# Patient Record
Sex: FEMALE | Race: WHITE | Employment: FULL TIME | URBAN - METROPOLITAN AREA
[De-identification: names, ages, dates, MRNs, and addresses within clinical notes are randomized per-mention and may not be internally consistent; named-entity substitution may affect disease eponyms.]

---

## 2017-01-29 RX ORDER — VALSARTAN 160 MG/1
TABLET ORAL
Qty: 90 TAB | Refills: 3 | Status: SHIPPED | OUTPATIENT
Start: 2017-01-29 | End: 2017-04-06 | Stop reason: SDUPTHER

## 2017-04-06 ENCOUNTER — OFFICE VISIT (OUTPATIENT)
Dept: INTERNAL MEDICINE CLINIC | Age: 62
End: 2017-04-06

## 2017-04-06 VITALS
SYSTOLIC BLOOD PRESSURE: 150 MMHG | HEIGHT: 65 IN | RESPIRATION RATE: 16 BRPM | TEMPERATURE: 96.7 F | BODY MASS INDEX: 26.39 KG/M2 | HEART RATE: 91 BPM | WEIGHT: 158.4 LBS | DIASTOLIC BLOOD PRESSURE: 70 MMHG | OXYGEN SATURATION: 97 %

## 2017-04-06 DIAGNOSIS — J30.89 NON-SEASONAL ALLERGIC RHINITIS, UNSPECIFIED ALLERGIC RHINITIS TRIGGER: ICD-10-CM

## 2017-04-06 DIAGNOSIS — I10 ESSENTIAL HYPERTENSION: Primary | ICD-10-CM

## 2017-04-06 DIAGNOSIS — R05.3 CHRONIC COUGH: ICD-10-CM

## 2017-04-06 RX ORDER — VALSARTAN 320 MG/1
320 TABLET ORAL DAILY
Qty: 90 TAB | Refills: 1 | Status: SHIPPED | OUTPATIENT
Start: 2017-04-06 | End: 2017-09-28 | Stop reason: SDUPTHER

## 2017-04-06 NOTE — MR AVS SNAPSHOT
Visit Information Date & Time Provider Department Dept. Phone Encounter #  
 4/6/2017  8:20 AM Angie Cerrato MD [place] [id number] [facility] [phone] [id number] Follow-up Instructions Return in about 3 weeks (around 4/27/2017) for 3- 4 week establish care / hypertension check . Upcoming Health Maintenance Date Due COLONOSCOPY 6/12/1973 BREAST CANCER SCRN MAMMOGRAM 6/12/2005 ZOSTER VACCINE AGE 60> 6/12/2015 PAP AKA CERVICAL CYTOLOGY 12/15/2019 DTaP/Tdap/Td series (3 - Td) 10/6/2025 Allergies as of 4/6/2017  Review Complete On: 4/6/2017 By: Angie Cerrato MD  
 No Known Allergies Current Immunizations  Reviewed on 10/15/2015 Name Date Influenza Vaccine 10/27/2016, 10/6/2015, 10/31/2014, 11/5/2013  8:45 AM  
 Pneumococcal Polysaccharide (PPSV-23) 11/30/2016 TDAP Vaccine 3/28/2012 Tdap 10/6/2015 Not reviewed this visit You Were Diagnosed With   
  
 Codes Comments Essential hypertension    -  Primary ICD-10-CM: I10 
ICD-9-CM: 401.9 Cough     ICD-10-CM: R05 ICD-9-CM: 786.2 Non-seasonal allergic rhinitis, unspecified allergic rhinitis trigger     ICD-10-CM: J30.89 ICD-9-CM: 477.8 Vitals BP Pulse Temp Resp Height(growth percentile) Weight(growth percentile) 150/70 (BP 1 Location: Left arm, BP Patient Position: Sitting) 91 96.7 °F (35.9 °C) 16 5' 5\" (1.651 m) 158 lb 6.4 oz (71.8 kg) SpO2 BMI OB Status Smoking Status 97% 26.36 kg/m2 Hysterectomy Former Smoker BMI and BSA Data Body Mass Index Body Surface Area  
 26.36 kg/m 2 1.81 m 2 Preferred Pharmacy Pharmacy Name Phone  [address] [phone] Your Updated Medication List  
  
   
This list is accurate as of: 4/6/17  9:01 AM.  Always use your most recent med list. ALLEGRA ALLERGY PO Take  by mouth. ibuprofen 200 mg tablet Commonly known as:  MOTRIN  
 Take 200 mg by mouth nightly. multivitamin tablet Commonly known as:  ONE A DAY Take 1 Tab by mouth daily. simvastatin 20 mg tablet Commonly known as:  ZOCOR  
TAKE 1 TABLET NIGHTLY  
  
 TAZORAC 0.05 % topical gel Generic drug:  tazorotene Apply  to affected area nightly. use thin film  
  
 triamcinolone 55 mcg nasal inhaler Commonly known as:  NASACORT AQ  
2 Sprays daily. valsartan 320 mg tablet Commonly known as:  DIOVAN Take 1 Tab by mouth daily. Prescriptions Sent to Pharmacy Refills  
 valsartan (DIOVAN) 320 mg tablet 1 Sig: Take 1 Tab by mouth daily. Class: Normal  
 Pharmacy: [facility] [address] Ph #: [phone] Route: Oral  
  
We Performed the Following REFERRAL TO ALLERGY [REF5 Custom] Comments:  
 Please evaluate patient for chronic cough. Follow-up Instructions Return in about 3 weeks (around 4/27/2017) for 3- 4 week establish care / hypertension check . Referral Information Referral ID Referred By Referred To  
  
 [id number] [address] Candi Morris MD   
   [address] [place] [address] Phone: [phone] Fax: [phone] Visits Status Start Date End Date 1 New Request 4/6/17 4/6/18 If your referral has a status of pending review or denied, additional information will be sent to support the outcome of this decision. Introducing \[facility]\"" & HEALTH SERVICES! Elysia Stubbs introduces [employer] patient portal. Now you can access parts of your medical record, email your doctor's office, and request medication refills online. 1. In your internet browser, go to [email or web address] 2. Click on the First Time User? Click Here link in the Sign In box. You will see the New Member Sign Up page. 3. Enter your [employer] Access Code exactly as it appears below.  You will not need to use this code after youve completed the sign-up process. If you do not sign up before the expiration date, you must request a new code. · [employer] Access Code: [email or web address] Expires: 7/5/2017  9:01 AM 
 
4. Enter the last four digits of your Social Security Number (xxxx) and Date of Birth (mm/dd/yyyy) as indicated and click Submit. You will be taken to the next sign-up page. 5. Create a [employer] ID. This will be your [employer] login ID and cannot be changed, so think of one that is secure and easy to remember. 6. Create a [employer] password. You can change your password at any time. 7. Enter your Password Reset Question and Answer. This can be used at a later time if you forget your password. 8. Enter your e-mail address. You will receive e-mail notification when new information is available in [address]. 9. Click Sign Up. You can now view and download portions of your medical record. 10. Click the Download Summary menu link to download a portable copy of your medical information. If you have questions, please visit the Frequently Asked Questions section of the [employer] website. Remember, [employer] is NOT to be used for urgent needs. For medical emergencies, dial 911. Now available from your iPhone and Android! Please provide this summary of care documentation to your next provider. Your primary care clinician is listed as [address]. If you have any questions after today's visit, please call [phone].

## 2017-04-06 NOTE — PROGRESS NOTES
HPI:  Artemio Ramsey is a 64y.o. year old female who returns to clinic today for routine follow up appointment to discuss the issues below:    Here for blood pressure recheck. BP readings at home on home cuff are generally above 140/90. Went [facility] for [facility] symptoms last week - had exposure to those with pneumonia, strep and flu and wanted to make sure she didn't have these illnesses. She has had an ongoing cough productive of clear phlegm over the past year and a half associated with mild nasal congestion. She denies any seasonality to cough. On Nasacort, allegra and recently added Coricidin HBP - this has improved her sx. Remotely saw an allergist .        CXR clear at [facility].    No rhinorrhea, itchy water eyes, wheezing or chest tightness. Reports dyspnea with stair climbing due to \"being out of shape. \"      Prior to Admission medications    Medication Sig Start Date End Date Taking? Authorizing Provider   valsartan (DIOVAN) 160 mg tablet TAKE 1 TABLET DAILY 1/29/17  Yes Humaira Woodruff MD   simvastatin (ZOCOR) 20 mg tablet TAKE 1 TABLET NIGHTLY 12/4/16  Yes Humaira Woodruff MD   triamcinolone (NASACORT AQ) 55 mcg nasal inhaler 2 Sprays daily. Yes Historical Provider   multivitamin (ONE A DAY) tablet Take 1 Tab by mouth daily. Yes Historical Provider   tazorotene (TAZORAC) 0.05 % topical gel Apply  to affected area nightly. use thin film   Yes Historical Provider   FEXOFENADINE HCL (ALLEGRA ALLERGY PO) Take  by mouth. Historical Provider   ibuprofen (MOTRIN) 200 mg tablet Take 200 mg by mouth nightly. Historical Provider          No Known Allergies        Review of Systems   Constitutional: Negative for chills, fever and malaise/fatigue. HENT: Positive for congestion. Respiratory: Positive for cough and shortness of breath (winded when she climbs steps ). Negative for wheezing. Cardiovascular: Negative for chest pain, palpitations and leg swelling.    Gastrointestinal: Negative for abdominal pain, blood in stool and heartburn. Musculoskeletal: Negative for falls, joint pain and myalgias. Neurological: Negative for dizziness and headaches. Physical Exam   Constitutional: She appears well-nourished. HENT:   Head: Head is without raccoon's eyes. Right Ear: Tympanic membrane and ear canal normal.   Left Ear: Tympanic membrane and ear canal normal.   Nose: No mucosal edema, rhinorrhea or nasal deformity. Mouth/Throat: Uvula is midline and oropharynx is clear and moist. No posterior oropharyngeal erythema. Neck: Carotid bruit is not present. Cardiovascular: Normal rate, regular rhythm and normal heart sounds. No murmur heard. Pulses:       Carotid pulses are 2+ on the right side, and 2+ on the left side. Pulmonary/Chest: Effort normal and breath sounds normal.   Abdominal: Soft. Bowel sounds are normal. There is no hepatosplenomegaly. There is no tenderness. Musculoskeletal: She exhibits no edema. Visit Vitals    /70 (BP 1 Location: Left arm, BP Patient Position: Sitting)    Pulse 91    Temp 96.7 °F (35.9 °C)    Resp 16    Ht 5' 5\" (1.651 m)    Wt 158 lb 6.4 oz (71.8 kg)    SpO2 97%    BMI 26.36 kg/m2         Assessment & Plan:  Vj Gómez was seen today for elevated blood pressure and allergies. Diagnoses and all orders for this visit:    Essential hypertension  BP above goal.  Increase Diovan to 320 mg daily  F/u in 3-4 weeks, continue home monitoring.  -     valsartan (DIOVAN) 320 mg tablet; Take 1 Tab by mouth daily. Chronic cough  ddx - allergic cough, cough variant asthma, neurogenic cough. I will have her visit with an allergist and go from there.  -     REFERRAL TO ALLERGY    Non-seasonal allergic rhinitis, unspecified allergic rhinitis trigger  On three drug regimen with improvement with addition of Coricidin.   Advised to continue for now until allergist sherri.    -     REFERRAL TO ALLERGY         Follow-up Disposition:  Return in about 3 weeks (around 4/27/2017) for 3- 4 week establish care / hypertension check . Advised her to call back or return to office if symptoms worsen/change/persist.  Discussed expected course/resolution/complications of diagnosis in detail with patient. Medication risks/benefits/costs/interactions/alternatives discussed with patient. She was given an after visit summary which includes diagnoses, current medications, & vitals. She expressed understanding with the diagnosis and plan.

## 2017-04-06 NOTE — PROGRESS NOTES
Chief Complaint   Patient presents with    Allergies    Elevated Blood Pressure     Reviewed record in preparation for visit and have obtained necessary documentation. Identified pt with two pt identifiers(name and ). Health Maintenance Due   Topic    COLONOSCOPY     BREAST CANCER SCRN MAMMOGRAM     ZOSTER VACCINE AGE 60>          Chief Complaint   Patient presents with    Allergies    Elevated Blood Pressure        Wt Readings from Last 3 Encounters:   17 158 lb 6.4 oz (71.8 kg)   12/15/16 165 lb (74.8 kg)   16 165 lb (74.8 kg)     Temp Readings from Last 3 Encounters:   17 96.7 °F (35.9 °C)   12/15/16 97.5 °F (36.4 °C) (Oral)   16 97.7 °F (36.5 °C) (Oral)     BP Readings from Last 3 Encounters:   17 150/70   12/15/16 148/86   16 158/90     Pulse Readings from Last 3 Encounters:   17 91   12/15/16 77   16 76           Learning Assessment:  :     Learning Assessment 10/1/2015 7/10/2014   PRIMARY LEARNER Patient Patient   HIGHEST LEVEL OF EDUCATION - PRIMARY LEARNER  SOME COLLEGE SOME COLLEGE   BARRIERS PRIMARY LEARNER NONE NONE   CO-LEARNER CAREGIVER No No   PRIMARY LANGUAGE ENGLISH ENGLISH    NEED No No   LEARNER PREFERENCE PRIMARY READING LISTENING   LEARNING SPECIAL TOPICS no no   ANSWERED BY patient patient   RELATIONSHIP SELF SELF       Depression Screening:  :     PHQ 2 / 9, over the last two weeks 2016   Little interest or pleasure in doing things Not at all   Feeling down, depressed or hopeless Not at all   Total Score PHQ 2 0       Fall Risk Assessment:  :     No flowsheet data found. Abuse Screening:  :     Abuse Screening Questionnaire 10/1/2015 7/10/2014   Do you ever feel afraid of your partner? N N   Are you in a relationship with someone who physically or mentally threatens you? N N   Is it safe for you to go home?  Y Y       Coordination of Care Questionnaire:  :     1) Have you been to an emergency room, urgent care clinic since your last visit? no   Hospitalized since your last visit? no             2) Have you seen or consulted any other health care providers outside of [address] [place] [address] since your last visit? no  (Include any pap smears or colon screenings in this section.)    3) Do you have an Advance Directive on file? no    4) Are you interested in receiving information on Advance Directives? NO      Patient is accompanied by self I have received verbal consent from Laura Reid to discuss any/all medical information while they are present in the room. Reviewed record  In preparation for visit and have obtained necessary documentation.

## 2017-04-26 ENCOUNTER — OFFICE VISIT (OUTPATIENT)
Dept: INTERNAL MEDICINE CLINIC | Age: 62
End: 2017-04-26

## 2017-04-26 VITALS
SYSTOLIC BLOOD PRESSURE: 130 MMHG | HEART RATE: 77 BPM | WEIGHT: 155 LBS | BODY MASS INDEX: 25.83 KG/M2 | HEIGHT: 65 IN | RESPIRATION RATE: 18 BRPM | TEMPERATURE: 98.2 F | DIASTOLIC BLOOD PRESSURE: 82 MMHG | OXYGEN SATURATION: 98 %

## 2017-04-26 DIAGNOSIS — J30.81 CAT ALLERGIES: ICD-10-CM

## 2017-04-26 DIAGNOSIS — Z00.00 ROUTINE GENERAL MEDICAL EXAMINATION AT A HEALTH CARE FACILITY: ICD-10-CM

## 2017-04-26 DIAGNOSIS — E55.9 VITAMIN D DEFICIENCY: ICD-10-CM

## 2017-04-26 DIAGNOSIS — E04.1 THYROID NODULE: ICD-10-CM

## 2017-04-26 DIAGNOSIS — R51.9 NONINTRACTABLE HEADACHE, UNSPECIFIED CHRONICITY PATTERN, UNSPECIFIED HEADACHE TYPE: ICD-10-CM

## 2017-04-26 DIAGNOSIS — I10 ESSENTIAL HYPERTENSION: Primary | ICD-10-CM

## 2017-04-26 NOTE — PROGRESS NOTES
Chief Complaint   Patient presents with    Establish Care     switch from [address] [place] [address]     1. Have you been to the ER, urgent care clinic since your last visit? No  Hospitalized since your last visit? No    2. Have you seen or consulted any other health care providers outside of the [address] [place] [address] since your last visit? Yes, Dr Reginaldo Pagan any pap smears or colon screening.  No

## 2017-04-26 NOTE — MR AVS SNAPSHOT
Visit Information Date & Time Provider Department Dept. Phone Encounter #  
 4/26/2017  8:15 AM Brigida Teran MD [place] [address] [facility] [phone] Upcoming Health Maintenance Date Due COLONOSCOPY 6/12/1973 BREAST CANCER SCRN MAMMOGRAM 6/12/2005 ZOSTER VACCINE AGE 60> 6/12/2015 PAP AKA CERVICAL CYTOLOGY 12/15/2019 DTaP/Tdap/Td series (3 - Td) 10/6/2025 Allergies as of 4/26/2017  Review Complete On: 4/26/2017 By: Jyotsna Cottrell No Known Allergies Current Immunizations  Reviewed on 10/15/2015 Name Date Influenza Vaccine 10/27/2016, 10/6/2015, 10/31/2014, 11/5/2013  8:45 AM  
 Pneumococcal Polysaccharide (PPSV-23) 11/30/2016 TDAP Vaccine 3/28/2012 Tdap 10/6/2015 Not reviewed this visit You Were Diagnosed With   
  
 Codes Comments Essential hypertension    -  Primary ICD-10-CM: I10 
ICD-9-CM: 401.9 Vitamin D deficiency     ICD-10-CM: E55.9 ICD-9-CM: 268.9 Cat allergies     ICD-10-CM: J30.81 ICD-9-CM: 477.8 Nonintractable headache, unspecified chronicity pattern, unspecified headache type     ICD-10-CM: R51 ICD-9-CM: 879. 0 Vitals BP Pulse Temp Resp Height(growth percentile) Weight(growth percentile) 130/82 (BP 1 Location: Right arm, BP Patient Position: Sitting) 77 98.2 °F (36.8 °C) (Oral) 18 5' 5\" (1.651 m) 155 lb (70.3 kg) SpO2 BMI OB Status Smoking Status 98% 25.79 kg/m2 Hysterectomy Former Smoker BMI and BSA Data Body Mass Index Body Surface Area 25.79 kg/m 2 1.8 m 2 Preferred Pharmacy Pharmacy Name Phone  [address] [phone] Your Updated Medication List  
  
   
This list is accurate as of: 4/26/17  8:36 AM.  Always use your most recent med list. ALLEGRA ALLERGY PO Take  by mouth. ibuprofen 200 mg tablet Commonly known as:  MOTRIN Take 200 mg by mouth nightly. multivitamin tablet Commonly known as:  ONE A DAY Take 1 Tab by mouth daily. simvastatin 20 mg tablet Commonly known as:  ZOCOR  
TAKE 1 TABLET NIGHTLY  
  
 TAZORAC 0.05 % topical gel Generic drug:  tazorotene Apply  to affected area nightly. use thin film  
  
 triamcinolone 55 mcg nasal inhaler Commonly known as:  NASACORT AQ  
2 Sprays daily. valsartan 320 mg tablet Commonly known as:  DIOVAN Take 1 Tab by mouth daily. To-Do List   
 04/27/2017 Lab:  METABOLIC PANEL, COMPREHENSIVE Patient Instructions Home-made Nasal Saline Rinse Directions for preparing home-made nasal saline 1. Clean a 1-Quart glass jar carefully, then fill it with bottled or boiled water. 2. Add 1 or 2 heaping teaspoons of pickling or brian salt, or Kosher salt. If you use table salt, you may be getting a preservative and/or additive which might irritate your nose. 3. Add 1 rounded teaspoon of baking soda (pure bicarbonate). 4. Store at room temperature and shake or stir before each use. 5. Mix a new batch weekly. To use: 1. Pour some of the mixture into a clean bowl. Warming it to body temperature may help, but make sure it is not hot. 2. Fill a spray bottle or a bulb syringe. 3. Stand over the sink or in the shower and apply gentle pressure on the syringe or bottle so that the the mixture goes into one side of the nose and comes out the other side. Tilt your head so you are inserting the syringe on the top nostril and forward so it doesn't go to the back of throat. Use slight pressure from the syringe and allow the mixture to come out the other nostril on the bottom as your head is tilted. 4. Rinse the nose at daily especially at night before bedtime to wash away all of the allergens you accumulated during the day. You will breathe better and therefore sleep deeper. Increase vit D to 2000 units a day Introducing [facility] & HEALTH SERVICES! Dear Festus Jonas: Thank you for requesting a [employer] account. Our records indicate that you already have an active [employer] account. You can access your account anytime at [email or web address] Did you know that you can access your hospital and ER discharge instructions at any time in Hypercontext? You can also review all of your test results from your hospital stay or ER visit. Additional Information If you have questions, please visit the Frequently Asked Questions section of the [employer] website at [email or web address] Remember, [employer] is NOT to be used for urgent needs. For medical emergencies, dial 911. Now available from your iPhone and Android! Please provide this summary of care documentation to your next provider. Your primary care clinician is listed as [address]. If you have any questions after today's visit, please call [phone].

## 2017-04-26 NOTE — PROGRESS NOTES
Establish Care (switch from [facility]) and Hypertension       HPI:  Mallika Weems is a 64y.o. year old female who is here to establish care. She  had her medical care:   MPL    She reports the following history and medical concerns:      Just adjusted BP meds. Checked it at home 135/66. BP Readings from Last 3 Encounters:   04/26/17 130/82   04/06/17 150/70   12/15/16 148/86      Pulse Readings from Last 3 Encounters:   04/26/17 77   04/06/17 91   12/15/16 77       HTN- on diovan 320 mg -4  Years with BP. No heart issues  Chol- simvastatin 20 mg- long duration  Allergies- nasacort and allergies. Coricidin. 2 days ago- developed a headache. Drinks water and it goes away. But comes back. Not intense headache. No visual problems. Has not had colonoscopy. Will call and get this and mammogram.        Assessment and Plan        1. Essential hypertension  Valsartan 320 mg.    2. Vitamin D deficiency  Increase vit d to 2000 units    3. Cat allergies  Saline washes. 4. Nonintractable headache, unspecified chronicity pattern, unspecified headache type  Likely tension HA but could be related to also medication. Call if not resolved after epsom salt bathes, ergonomics. 5. Thyroid swelling on right. Not tender. Schedule ultrasound to evaluate. Visit Vitals    /82 (BP 1 Location: Right arm, BP Patient Position: Sitting)    Pulse 77    Temp 98.2 °F (36.8 °C) (Oral)    Resp 18    Ht 5' 5\" (1.651 m)    Wt 155 lb (70.3 kg)    SpO2 98%    BMI 25.79 kg/m2       Historical Data    Past Medical History:   Diagnosis Date    Dyspnea     Fatigue     Sleep apnea     \"mild\"       Past Surgical History:   Procedure Laterality Date    HX HYSTERECTOMY      (partial)       Outpatient Encounter Prescriptions as of 4/26/2017   Medication Sig Dispense Refill    valsartan (DIOVAN) 320 mg tablet Take 1 Tab by mouth daily. 90 Tab 1    FEXOFENADINE HCL (ALLEGRA ALLERGY PO) Take  by mouth.  simvastatin (ZOCOR) 20 mg tablet TAKE 1 TABLET NIGHTLY 90 Tab 3    triamcinolone (NASACORT AQ) 55 mcg nasal inhaler 2 Sprays daily.  multivitamin (ONE A DAY) tablet Take 1 Tab by mouth daily.  tazorotene (TAZORAC) 0.05 % topical gel Apply  to affected area nightly. use thin film      ibuprofen (MOTRIN) 200 mg tablet Take 200 mg by mouth nightly. No facility-administered encounter medications on file as of 4/26/2017. No Known Allergies     Social History     Social History    Marital status:      Spouse name: N/A    Number of children: N/A    Years of education: N/A     Occupational History    Not on file. Social History Main Topics    Smoking status: Former Smoker     Quit date: 1/1/1982    Smokeless tobacco: Never Used    Alcohol use 3.0 oz/week     6 Standard drinks or equivalent per week    Drug use: Not on file    Sexual activity: Not on file     Other Topics Concern    Not on file     Social History Narrative        Review of Systems   Constitutional: Negative for chills, diaphoresis, fever, malaise/fatigue and weight loss. HENT: Negative for hearing loss. Respiratory: Negative for cough. Cardiovascular: Negative for chest pain. Gastrointestinal: Negative for blood in stool and constipation. Genitourinary: Negative for dysuria, flank pain, frequency and urgency. Musculoskeletal: Negative for myalgias. Skin: Negative for rash. Neurological: Negative for dizziness, weakness and headaches. Endo/Heme/Allergies: Does not bruise/bleed easily. Physical Exam   Constitutional: She appears well-developed and well-nourished. She is active. Non-toxic appearance. She does not have a sickly appearance. She does not appear ill. No distress. Eyes: Conjunctivae are normal.   Neck: Normal range of motion. Neck supple. Carotid bruit is not present. No tracheal deviation present. Thyromegaly present.        Cardiovascular: Normal rate, regular rhythm, S1 normal, S2 normal, normal heart sounds and normal pulses. Exam reveals no gallop and no friction rub. Pulmonary/Chest: Effort normal and breath sounds normal. No respiratory distress. Abdominal: Soft. Bowel sounds are normal.   Musculoskeletal: She exhibits no edema or deformity. Lymphadenopathy:     She has no cervical adenopathy. Neurological: She is alert. Skin: Skin is warm and dry. No rash noted. No pallor. Psychiatric: She has a normal mood and affect. Her behavior is normal.      Ortho Exam       Orders Placed This Encounter    US THYROID/PARATHYROID/SOFT TISS     Standing Status:   Future     Standing Expiration Date:   8/13/6606    METABOLIC PANEL, COMPREHENSIVE     Standing Status:   Future     Standing Expiration Date:   10/23/2017    TSH REFLEX TO T4        I have reviewed the patient's medical history in detail and updated the computerized patient record. We had a prolonged discussion about these complex clinical issues and went over the various important aspects to consider. All questions were answered. Advised her to call back or return to office if symptoms do not improve, change in nature, or persist.    She was given an after visit summary or informed of [email or web address] Access which includes patient instructions, diagnoses, current medications, & vitals. She expressed understanding with the diagnosis and plan.

## 2017-04-26 NOTE — PATIENT INSTRUCTIONS
Home-made Nasal Saline Rinse  Directions for preparing home-made nasal saline  1. Clean a 1-Quart glass jar carefully, then fill it with bottled or boiled water. 2. Add 1 or 2 heaping teaspoons of pickling or brian salt, or Kosher salt. If you use table salt, you may be getting a preservative and/or additive which might irritate your nose. 3. Add 1 rounded teaspoon of baking soda (pure bicarbonate). 4. Store at room temperature and shake or stir before each use. 5. Mix a new batch weekly. To use:  1. Pour some of the mixture into a clean bowl. Warming it to body temperature may help, but make sure it is not hot. 2. Fill a spray bottle or a bulb syringe. 3. Stand over the sink or in the shower and apply gentle pressure on the syringe or bottle so that the the mixture goes into one side of the nose and comes out the other side. Tilt your head so you are inserting the syringe on the top nostril and forward so it doesn't go to the back of throat. Use slight pressure from the syringe and allow the mixture to come out the other nostril on the bottom as your head is tilted. 4. Rinse the nose at daily especially at night before bedtime to wash away all of the allergens you accumulated during the day. You will breathe better and therefore sleep deeper.         Increase vit D to 2000 units a day

## 2017-04-27 DIAGNOSIS — I10 ESSENTIAL HYPERTENSION: ICD-10-CM

## 2017-04-27 LAB — TSH SERPL DL<=0.005 MIU/L-ACNC: 1.9 UIU/ML (ref 0.45–4.5)

## 2017-05-04 ENCOUNTER — HOSPITAL ENCOUNTER (OUTPATIENT)
Dept: ULTRASOUND IMAGING | Age: 62
Discharge: HOME | End: 2017-05-04
Attending: INTERNAL MEDICINE
Payer: COMMERCIAL

## 2017-05-04 DIAGNOSIS — E04.1 THYROID NODULE: ICD-10-CM

## 2017-05-04 PROCEDURE — 76536 US EXAM OF HEAD AND NECK: CPT

## 2017-05-04 NOTE — PROGRESS NOTES
Inform patient that her thyroid was normal with a small cyst that appeared benign. No follow up needed for this.   Signed electronically by: Renita Durand MD at 3:12 PM on May 4, 2017

## 2017-05-05 ENCOUNTER — TELEPHONE (OUTPATIENT)
Dept: INTERNAL MEDICINE CLINIC | Age: 62
End: 2017-05-05

## 2017-05-05 NOTE — TELEPHONE ENCOUNTER
----- Message from Sree Lopez MD sent at 5/4/2017  3:13 PM EDT -----  Inform patient that her thyroid was normal with a small cyst that appeared benign. No follow up needed for this.   Signed electronically by: Sree Lopez MD at 3:12 PM on May 4, 2017

## 2017-05-05 NOTE — PROGRESS NOTES
Patient notified thyroid ultrasound was normal with a small cyst that appeared benign. No follow up needed.

## 2017-10-23 DIAGNOSIS — Z00.00 ROUTINE GENERAL MEDICAL EXAMINATION AT A HEALTH CARE FACILITY: ICD-10-CM

## 2017-11-21 RX ORDER — SIMVASTATIN 20 MG/1
TABLET, FILM COATED ORAL
Qty: 90 TAB | Refills: 3 | Status: SHIPPED | OUTPATIENT
Start: 2017-11-21 | End: 2018-11-07 | Stop reason: SDUPTHER

## 2017-11-21 NOTE — TELEPHONE ENCOUNTER
Lan from Dr. Martinez's office is calling regarding patient's referral appointment tomorrow.   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   However, there is no documentation that patient has been informed or of pathology results.   Informed Lan that I will send this to inquiry to Dr. Williamson and call her back.    Last office visit- 4/26/17  Next office visit- 12/4/17  Last refill-    Requested Prescriptions     Pending Prescriptions Disp Refills    simvastatin (ZOCOR) 20 mg tablet 90 Tab 3

## 2017-11-30 LAB
25(OH)D3+25(OH)D2 SERPL-MCNC: 29.7 NG/ML (ref 30–100)
ALBUMIN SERPL-MCNC: 4.5 G/DL (ref 3.6–4.8)
ALBUMIN/GLOB SERPL: 1.5 {RATIO} (ref 1.2–2.2)
ALP SERPL-CCNC: 68 IU/L (ref 39–117)
ALT SERPL-CCNC: 27 IU/L (ref 0–32)
APPEARANCE UR: CLEAR
AST SERPL-CCNC: 25 IU/L (ref 0–40)
BASOPHILS # BLD AUTO: 0 X10E3/UL (ref 0–0.2)
BASOPHILS NFR BLD AUTO: 1 %
BILIRUB SERPL-MCNC: 0.2 MG/DL (ref 0–1.2)
BILIRUB UR QL STRIP: NEGATIVE
BUN SERPL-MCNC: 14 MG/DL (ref 8–27)
BUN/CREAT SERPL: 20 (ref 12–28)
CALCIUM SERPL-MCNC: 9.4 MG/DL (ref 8.7–10.3)
CHLORIDE SERPL-SCNC: 103 MMOL/L (ref 96–106)
CHOLEST SERPL-MCNC: 213 MG/DL (ref 100–199)
CO2 SERPL-SCNC: 23 MMOL/L (ref 18–29)
COLOR UR: YELLOW
CREAT SERPL-MCNC: 0.7 MG/DL (ref 0.57–1)
EOSINOPHIL # BLD AUTO: 0.4 X10E3/UL (ref 0–0.4)
EOSINOPHIL NFR BLD AUTO: 8 %
ERYTHROCYTE [DISTWIDTH] IN BLOOD BY AUTOMATED COUNT: 13.3 % (ref 12.3–15.4)
GFR SERPLBLD CREATININE-BSD FMLA CKD-EPI: 107 ML/MIN/1.73
GFR SERPLBLD CREATININE-BSD FMLA CKD-EPI: 93 ML/MIN/1.73
GLOBULIN SER CALC-MCNC: 3 G/DL (ref 1.5–4.5)
GLUCOSE SERPL-MCNC: 87 MG/DL (ref 65–99)
GLUCOSE UR QL: NEGATIVE
HCT VFR BLD AUTO: 40.9 % (ref 34–46.6)
HDLC SERPL-MCNC: 59 MG/DL
HGB BLD-MCNC: 13.7 G/DL (ref 11.1–15.9)
HGB UR QL STRIP: NEGATIVE
IMM GRANULOCYTES # BLD: 0 X10E3/UL (ref 0–0.1)
IMM GRANULOCYTES NFR BLD: 0 %
INTERPRETATION, 910389: NORMAL
KETONES UR QL STRIP: NEGATIVE
LDLC SERPL CALC-MCNC: 97 MG/DL (ref 0–99)
LEUKOCYTE ESTERASE UR QL STRIP: NEGATIVE
LYMPHOCYTES # BLD AUTO: 1.5 X10E3/UL (ref 0.7–3.1)
LYMPHOCYTES NFR BLD AUTO: 32 %
MCH RBC QN AUTO: 31.3 PG (ref 26.6–33)
MCHC RBC AUTO-ENTMCNC: 33.5 G/DL (ref 31.5–35.7)
MCV RBC AUTO: 93 FL (ref 79–97)
MICRO URNS: NORMAL
MONOCYTES # BLD AUTO: 0.3 X10E3/UL (ref 0.1–0.9)
MONOCYTES NFR BLD AUTO: 6 %
NEUTROPHILS # BLD AUTO: 2.5 X10E3/UL (ref 1.4–7)
NEUTROPHILS NFR BLD AUTO: 53 %
NITRITE UR QL STRIP: NEGATIVE
PH UR STRIP: 6 [PH] (ref 5–7.5)
PLATELET # BLD AUTO: 234 X10E3/UL (ref 150–379)
POTASSIUM SERPL-SCNC: 4.3 MMOL/L (ref 3.5–5.2)
PROT SERPL-MCNC: 7.5 G/DL (ref 6–8.5)
PROT UR QL STRIP: NEGATIVE
RBC # BLD AUTO: 4.38 X10E6/UL (ref 3.77–5.28)
SODIUM SERPL-SCNC: 145 MMOL/L (ref 134–144)
SP GR UR: 1.02 (ref 1–1.03)
TRIGL SERPL-MCNC: 286 MG/DL (ref 0–149)
TSH SERPL DL<=0.005 MIU/L-ACNC: 2.58 UIU/ML (ref 0.45–4.5)
UROBILINOGEN UR STRIP-MCNC: 0.2 MG/DL (ref 0.2–1)
VLDLC SERPL CALC-MCNC: 57 MG/DL (ref 5–40)
WBC # BLD AUTO: 4.7 X10E3/UL (ref 3.4–10.8)

## 2017-12-04 ENCOUNTER — OFFICE VISIT (OUTPATIENT)
Dept: INTERNAL MEDICINE CLINIC | Age: 62
End: 2017-12-04

## 2017-12-04 VITALS
SYSTOLIC BLOOD PRESSURE: 140 MMHG | HEIGHT: 65 IN | TEMPERATURE: 97.6 F | OXYGEN SATURATION: 98 % | WEIGHT: 156.6 LBS | BODY MASS INDEX: 26.09 KG/M2 | RESPIRATION RATE: 16 BRPM | HEART RATE: 82 BPM | DIASTOLIC BLOOD PRESSURE: 80 MMHG

## 2017-12-04 DIAGNOSIS — E78.00 HYPERCHOLESTEROLEMIA: ICD-10-CM

## 2017-12-04 DIAGNOSIS — E55.9 VITAMIN D DEFICIENCY: ICD-10-CM

## 2017-12-04 DIAGNOSIS — E78.1 HIGH TRIGLYCERIDES: ICD-10-CM

## 2017-12-04 DIAGNOSIS — I10 ESSENTIAL HYPERTENSION: ICD-10-CM

## 2017-12-04 DIAGNOSIS — Z00.00 ROUTINE GENERAL MEDICAL EXAMINATION AT A HEALTH CARE FACILITY: Primary | ICD-10-CM

## 2017-12-04 RX ORDER — HYDROCHLOROTHIAZIDE 12.5 MG/1
12.5 TABLET ORAL DAILY
Qty: 90 TAB | Refills: 1 | Status: SHIPPED | OUTPATIENT
Start: 2017-12-04 | End: 2018-02-21 | Stop reason: DRUGHIGH

## 2017-12-04 NOTE — PROGRESS NOTES
Chief Complaint   Patient presents with    Complete Physical     Reviewed record in preparation for visit and have obtained necessary documentation. Identified pt with two pt identifiers(name and ). Health Maintenance Due   Topic    COLONOSCOPY     BREAST CANCER SCRN MAMMOGRAM          Chief Complaint   Patient presents with    Complete Physical        Wt Readings from Last 3 Encounters:   17 156 lb 9.6 oz (71 kg)   17 155 lb (70.3 kg)   17 158 lb 6.4 oz (71.8 kg)     Temp Readings from Last 3 Encounters:   17 97.6 °F (36.4 °C) (Oral)   17 98.2 °F (36.8 °C) (Oral)   17 96.7 °F (35.9 °C)     BP Readings from Last 3 Encounters:   17 140/80   17 130/82   17 150/70     Pulse Readings from Last 3 Encounters:   17 82   17 77   17 91           Learning Assessment:  :     Learning Assessment 10/1/2015 7/10/2014   PRIMARY LEARNER Patient Patient   HIGHEST LEVEL OF EDUCATION - PRIMARY LEARNER  SOME COLLEGE SOME COLLEGE   BARRIERS PRIMARY LEARNER NONE NONE   CO-LEARNER CAREGIVER No No   PRIMARY LANGUAGE ENGLISH ENGLISH    NEED No No   LEARNER PREFERENCE PRIMARY READING LISTENING   LEARNING SPECIAL TOPICS no no   ANSWERED BY patient patient   RELATIONSHIP SELF SELF       Depression Screening:  :     PHQ over the last two weeks 2017   Little interest or pleasure in doing things Not at all   Feeling down, depressed or hopeless Not at all   Total Score PHQ 2 0       Fall Risk Assessment:  :     No flowsheet data found. Abuse Screening:  :     Abuse Screening Questionnaire 10/1/2015 7/10/2014   Do you ever feel afraid of your partner? N N   Are you in a relationship with someone who physically or mentally threatens you? N N   Is it safe for you to go home? Y Y       Coordination of Care Questionnaire:  :     1) Have you been to an emergency room, urgent care clinic since your last visit? no   Hospitalized since your last visit? no             2) Have you seen or consulted any other health care providers outside of [address] [place] [address] since your last visit? no  (Include any pap smears or colon screenings in this section.)    3) Do you have an Advance Directive on file? no    4) Are you interested in receiving information on Advance Directives? NO      Patient is accompanied by self I have received verbal consent from Theron Rodriguez to discuss any/all medical information while they are present in the room. Reviewed record  In preparation for visit and have obtained necessary documentation.

## 2017-12-04 NOTE — PROGRESS NOTES
HPI:  Theron Rodriguez is a 58y.o. year old female who is here for an annual physical:    Wt Readings from Last 3 Encounters:   12/04/17 156 lb 9.6 oz (71 kg)   04/26/17 155 lb (70.3 kg)   04/06/17 158 lb 6.4 oz (71.8 kg)     Temp Readings from Last 3 Encounters:   12/04/17 97.6 °F (36.4 °C) (Oral)   04/26/17 98.2 °F (36.8 °C) (Oral)   04/06/17 96.7 °F (35.9 °C)     BP Readings from Last 3 Encounters:   12/04/17 140/80   04/26/17 130/82   04/06/17 150/70     Pulse Readings from Last 3 Encounters:   12/04/17 82   04/26/17 77   04/06/17 91        She reports the following history and medical concerns:      Low Vit D- takes 2000 of D3. Increase to 4000 of D3. Hyperlipidemia    Patient has history of hyperlipidemia that is being managed with medications. She has been taking her statin cholesterol medication regularly without side effects such as myalgias or upper abdominal pain, nausea or jaundice. Lab Results   Component Value Date/Time    Cholesterol, total 213 11/28/2017 12:00 AM    HDL Cholesterol 59 11/28/2017 12:00 AM    LDL, calculated 97 11/28/2017 12:00 AM    VLDL, calculated 57 11/28/2017 12:00 AM    Triglyceride 286 11/28/2017 12:00 AM         High triglycerides-  Pt thinks more genetic. 140/80 at home. Pt will get colonoscopy    Pt will get mammogram.        Assessment and Plan        1. Routine general medical examination at a health care facility  Pt has information about getting mammogram and colonoscopy. 2. High triglycerides  Reduce carbs but pt says she eats low carbs and that this is genetic. 3. Hypercholesterolemia  The nature of cardiac risk has been fully discussed with this patient. I have made her aware of her LDL target goal given her cardiovascular risk analysis. I have discussed the appropriate diet. The need for lifelong compliance in order to reduce risk is stressed.  A regular exercise program is recommended to help achieve and maintain normal body weight, fitness and improve lipid balance. The risks and benefits of medications were discussed. Last cholesterol labs reviewed with patient. Patient made aware to get liver checked every 6 months. Continue with  Simvastatin 20 mg.     4. Essential hypertension  Add hctz 12.5 mg to diovan. After 3 months, we can combine them when she runs out of diovan. Lamp        Historical Data    Past Medical History:   Diagnosis Date    Cat allergies 7/10/2014    Essential hypertension 10/15/2015    Hypercholesterolemia 8/13/2013    Sleep apnea     \"mild\"    Vitamin D deficiency 12/4/2017       Past Surgical History:   Procedure Laterality Date    HX HYSTERECTOMY      (partial)       Outpatient Encounter Prescriptions as of 12/4/2017   Medication Sig Dispense Refill    hydroCHLOROthiazide (HYDRODIURIL) 12.5 mg tablet Take 1 Tab by mouth daily. 90 Tab 1    simvastatin (ZOCOR) 20 mg tablet TAKE 1 TABLET NIGHTLY 90 Tab 3    valsartan (DIOVAN) 320 mg tablet TAKE 1 TABLET DAILY 90 Tab 3    FEXOFENADINE HCL (ALLEGRA ALLERGY PO) Take  by mouth.  triamcinolone (NASACORT AQ) 55 mcg nasal inhaler 2 Sprays daily.  multivitamin (ONE A DAY) tablet Take 1 Tab by mouth daily.  [DISCONTINUED] tazorotene (TAZORAC) 0.05 % topical gel Apply  to affected area nightly. use thin film      [DISCONTINUED] ibuprofen (MOTRIN) 200 mg tablet Take 200 mg by mouth nightly. No facility-administered encounter medications on file as of 12/4/2017. No Known Allergies     Social History     Social History    Marital status:      Spouse name: N/A    Number of children: N/A    Years of education: N/A     Occupational History    Not on file.      Social History Main Topics    Smoking status: Former Smoker     Quit date: 1/1/1982    Smokeless tobacco: Never Used    Alcohol use 3.0 oz/week     6 Standard drinks or equivalent per week      Comment: 1-2 a night    Drug use: Not on file    Sexual activity: Not on file     Other Topics Concern    Not on file     Social History Narrative        family history includes Elevated Lipids in her father and mother; Stroke (age of onset: 80) in her father. Review of Systems   Constitutional: Negative for chills, diaphoresis, fever, malaise/fatigue and weight loss. HENT: Negative for hearing loss. Respiratory: Negative for cough. Cardiovascular: Negative for chest pain. Gastrointestinal: Negative for blood in stool and constipation. Genitourinary: Negative for dysuria, flank pain, frequency and urgency. Musculoskeletal: Negative for myalgias. Skin: Negative for rash. Neurological: Positive for headaches (at work looking at screens. getting eyes checked. doesn't happen on weekends). Negative for dizziness and weakness. Endo/Heme/Allergies: Does not bruise/bleed easily. Visit Vitals    /80 (BP 1 Location: Left arm, BP Patient Position: Sitting)    Pulse 82    Temp 97.6 °F (36.4 °C) (Oral)    Resp 16    Ht 5' 5.35\" (1.66 m)    Wt 156 lb 9.6 oz (71 kg)    SpO2 98%    BMI 25.78 kg/m2         Physical Exam   Constitutional: She is oriented to person, place, and time and well-developed, well-nourished, and in no distress. No distress. HENT:   Nose: Nose normal.   Mouth/Throat: Oropharynx is clear and moist.   Eyes: Conjunctivae and EOM are normal.   Neck: Normal range of motion. Neck supple. No thyromegaly present. Cardiovascular: Normal rate, regular rhythm and normal heart sounds. Exam reveals no gallop and no friction rub. Pulmonary/Chest: Effort normal and breath sounds normal. No respiratory distress. She has no wheezes. She has no rales. Abdominal: Soft. Bowel sounds are normal. She exhibits no distension. There is no tenderness. Musculoskeletal: Normal range of motion. She exhibits no edema, tenderness or deformity. Lymphadenopathy:     She has no cervical adenopathy.    Neurological: She is alert and oriented to person, place, and time. Skin: Skin is warm and dry. No rash noted. Psychiatric: Affect and judgment normal.     Ortho Exam     Assessment:  See Above for discussion/plan. Annual Physical completed. I have reviewed the patient's medical history in detail and updated the computerized patient record. We had a prolonged discussion about these complex clinical issues and went over the various important aspects to consider. All questions were answered. Advised her to call back or return to office if symptoms do not improve, change in nature, or persist.    She was given an after visit summary or informed of [id number] which includes patient instructions, diagnoses, current medications, & vitals. She expressed understanding with the diagnosis and plan.

## 2017-12-04 NOTE — PATIENT INSTRUCTIONS
Hydrochlorothiazide (By mouth)   Hydrochlorothiazide (miladys-droe-klor-as-QUOR-n-zide)  Treats high blood pressure and fluid retention (edema). This medicine is a diuretic (water pill). Brand Name(s): Microzide   There may be other brand names for this medicine. When This Medicine Should Not Be Used: This medicine is not right for everyone. Do not use this medicine if you had an allergic reaction to hydrochlorothiazide or a sulfa drug. How to Use This Medicine:   Capsule, Liquid, Tablet  · Take your medicine as directed. Your dose may need to be changed several times to find what works best for you. · Measure the oral liquid medicine with a marked measuring spoon, oral syringe, or medicine cup. · Missed dose: Take a dose as soon as you remember. If it is almost time for your next dose, wait until then and take a regular dose. Do not take extra medicine to make up for a missed dose. · Store the medicine in a closed container at room temperature, away from heat, moisture, and direct light. Drugs and Foods to Avoid:   Ask your doctor or pharmacist before using any other medicine, including over-the-counter medicines, vitamins, and herbal products. · Some medicines and foods can affect how hydrochlorothiazide works. Tell your doctor if you are also using any of the following:   ¨ Cholestyramine, colestipol, digoxin, lithium, insulin or other diabetes medicine  ¨ An NSAID pain or arthritis medicine (such as aspirin, diclofenac, ibuprofen, naproxen, celecoxib), or a steroid medicine (such as hydrocortisone, methylprednisolone, prednisone, prednisolone, dexamethasone)  Warnings While Using This Medicine:   · Tell your doctor if you are pregnant or breastfeeding, or if you have kidney disease, liver disease, heart disease or heart failure, high cholesterol, diabetes, gout, trouble urinating, or lupus.   · This medicine may cause the following problems:  ¨ Glaucoma and other vision problems  ¨ Acute gout  ¨ Damage to the parathyroid gland  ¨ Low or high levels of minerals in your blood (including potassium and sodium)  · This medicine may make you dizzy. Do not drive or do anything else that could be dangerous until you know how this medicine affects you. · This medicine could lower your blood pressure too much, especially when you first use it or if you are dehydrated. Stand or sit up slowly if you feel lightheaded or dizzy. Alcohol may make this problem worse. · Tell any doctor or dentist who treats you that you are using this medicine. · Your doctor will check your progress and the effects of this medicine at regular visits. Keep all appointments. · Keep all medicine out of the reach of children. Never share your medicine with anyone. Possible Side Effects While Using This Medicine:   Call your doctor right away if you notice any of these side effects:  · Allergic reaction: Itching or hives, swelling in your face or hands, swelling or tingling in your mouth or throat, chest tightness, trouble breathing  · Blistering, peeling, or red skin rash  · Confusion, weakness, and muscle twitching  · Dry mouth, increased thirst, muscle cramps, nausea or vomiting, uneven heartbeat  · Lightheadedness, dizziness, or fainting  · Nausea, vomiting, unusual tiredness or weakness, muscle cramps, confusion  · Trouble seeing, eye pain, blurred vision or other vision changes  If you notice these less serious side effects, talk with your doctor:   · Headache  · Mild diarrhea, constipation, nausea  If you notice other side effects that you think are caused by this medicine, tell your doctor. Call your doctor for medical advice about side effects. You may report side effects to FDA at 5-506-FDA-9937  © 2017 [address] Information is for End User's use only and may not be sold, redistributed or otherwise used for commercial purposes. The above information is an  only.  It is not intended as medical advice for individual conditions or treatments. Talk to your doctor, nurse or pharmacist before following any medical regimen to see if it is safe and effective for you.

## 2017-12-04 NOTE — MR AVS SNAPSHOT
Visit Information Date & Time Provider Department Dept. Phone Encounter #  
 12/4/2017  8:15 AM [facility] MD [facility] [phone] Follow-up Instructions Return in about 6 months (around 6/4/2018) for Follow up. Upcoming Health Maintenance Date Due COLONOSCOPY 6/12/1973 BREAST CANCER SCRN MAMMOGRAM 6/12/2005 PAP AKA CERVICAL CYTOLOGY 12/15/2019 DTaP/Tdap/Td series (3 - Td) 10/6/2025 Allergies as of 12/4/2017  Review Complete On: 12/4/2017 By: Katia Jean-Baptiste LPN No Known Allergies Current Immunizations  Reviewed on 10/31/2017 Name Date Influenza Vaccine 10/21/2017, 10/27/2016, 10/6/2015, 10/31/2014, 11/5/2013  8:45 AM  
 Pneumococcal Polysaccharide (PPSV-23) 11/30/2016 TDAP Vaccine 3/28/2012 Tdap 10/6/2015 Zoster Vaccine, Live 7/22/2017 Not reviewed this visit You Were Diagnosed With   
  
 Codes Comments Routine general medical examination at a health care facility    -  Primary ICD-10-CM: Z00.00 ICD-9-CM: V70.0 Vitals BP Pulse Temp Resp Height(growth percentile) Weight(growth percentile) 140/80 (BP 1 Location: Left arm, BP Patient Position: Sitting) 82 97.6 °F (36.4 °C) (Oral) 16 5' 5.35\" (1.66 m) 156 lb 9.6 oz (71 kg) SpO2 BMI OB Status Smoking Status 98% 25.78 kg/m2 Hysterectomy Former Smoker Vitals History BMI and BSA Data Body Mass Index Body Surface Area 25.78 kg/m 2 1.81 m 2 Preferred Pharmacy Pharmacy Name Phone [facility]av[facility] [id number] [facility] [phone] Your Updated Medication List  
  
   
This list is accurate as of: 12/4/17  9:23 AM.  Always use your most recent med list. ALLEGRA ALLERGY PO Take  by mouth. hydroCHLOROthiazide 12.5 mg tablet Commonly known as:  HYDRODIURIL Take 1 Tab by mouth daily. ibuprofen 200 mg tablet Commonly known as:  MOTRIN Take 200 mg by mouth nightly. multivitamin tablet Commonly known as:  ONE A DAY Take 1 Tab by mouth daily. simvastatin 20 mg tablet Commonly known as:  ZOCOR  
TAKE 1 TABLET NIGHTLY  
  
 TAZORAC 0.05 % topical gel Generic drug:  tazorotene Apply  to affected area nightly. use thin film  
  
 triamcinolone 55 mcg nasal inhaler Commonly known as:  NASACORT AQ  
2 Sprays daily. valsartan 320 mg tablet Commonly known as:  DIOVAN  
TAKE 1 TABLET DAILY Prescriptions Sent to Pharmacy Refills  
 hydroCHLOROthiazide (HYDRODIURIL) 12.5 mg tablet 1 Sig: Take 1 Tab by mouth daily. Class: Normal  
 Pharmacy: [place] [id number] #: [phone] Route: Oral  
  
Follow-up Instructions Return in about 6 months (around 6/4/2018) for Follow up. Patient Instructions Hydrochlorothiazide (By mouth) Hydrochlorothiazide (miladys-droe-klor-hx-TWHH-u-zide) Treats high blood pressure and fluid retention (edema). This medicine is a diuretic (water pill). Brand Name(s): Microzide There may be other brand names for this medicine. When This Medicine Should Not Be Used: This medicine is not right for everyone. Do not use this medicine if you had an allergic reaction to hydrochlorothiazide or a sulfa drug. How to Use This Medicine:  
Capsule, Liquid, Tablet · Take your medicine as directed. Your dose may need to be changed several times to find what works best for you. · Measure the oral liquid medicine with a marked measuring spoon, oral syringe, or medicine cup. · Missed dose: Take a dose as soon as you remember. If it is almost time for your next dose, wait until then and take a regular dose. Do not take extra medicine to make up for a missed dose. · Store the medicine in a closed container at room temperature, away from heat, moisture, and direct light. Drugs and Foods to Avoid: Ask your doctor or pharmacist before using any other medicine, including over-the-counter medicines, vitamins, and herbal products. · Some medicines and foods can affect how hydrochlorothiazide works. Tell your doctor if you are also using any of the following: ¨ Cholestyramine, colestipol, digoxin, lithium, insulin or other diabetes medicine ¨ An NSAID pain or arthritis medicine (such as aspirin, diclofenac, ibuprofen, naproxen, celecoxib), or a steroid medicine (such as hydrocortisone, methylprednisolone, prednisone, prednisolone, dexamethasone) Warnings While Using This Medicine: · Tell your doctor if you are pregnant or breastfeeding, or if you have kidney disease, liver disease, heart disease or heart failure, high cholesterol, diabetes, gout, trouble urinating, or lupus. · This medicine may cause the following problems: ¨ Glaucoma and other vision problems ¨ Acute gout ¨ Damage to the parathyroid gland ¨ Low or high levels of minerals in your blood (including potassium and sodium) · This medicine may make you dizzy. Do not drive or do anything else that could be dangerous until you know how this medicine affects you. · This medicine could lower your blood pressure too much, especially when you first use it or if you are dehydrated. Stand or sit up slowly if you feel lightheaded or dizzy. Alcohol may make this problem worse. · Tell any doctor or dentist who treats you that you are using this medicine. · Your doctor will check your progress and the effects of this medicine at regular visits. Keep all appointments. · Keep all medicine out of the reach of children. Never share your medicine with anyone. Possible Side Effects While Using This Medicine:  
Call your doctor right away if you notice any of these side effects: · Allergic reaction: Itching or hives, swelling in your face or hands, swelling or tingling in your mouth or throat, chest tightness, trouble breathing · Blistering, peeling, or red skin rash · Confusion, weakness, and muscle twitching · Dry mouth, increased thirst, muscle cramps, nausea or vomiting, uneven heartbeat · Lightheadedness, dizziness, or fainting · Nausea, vomiting, unusual tiredness or weakness, muscle cramps, confusion · Trouble seeing, eye pain, blurred vision or other vision changes If you notice these less serious side effects, talk with your doctor:  
· Headache · Mild diarrhea, constipation, nausea If you notice other side effects that you think are caused by this medicine, tell your doctor. Call your doctor for medical advice about side effects. You may report side effects to FDA at 2-996-FDA-3180 © 2017 [address] Information is for End User's use only and may not be sold, redistributed or otherwise used for commercial purposes. The above information is an  only. It is not intended as medical advice for individual conditions or treatments. Talk to your doctor, nurse or pharmacist before following any medical regimen to see if it is safe and effective for you. Introducing \[facility] [place] [facility]\"" & HEALTH SERVICES! Dear Marlyn Fuentes: Thank you for requesting a [employer] account. Our records indicate that you already have an active [employer] account. You can access your account anytime at [email or web address] [email or web address] Did you know that you can access your hospital and ER discharge instructions at any time in [employer]? You can also review all of your test results from your hospital stay or ER visit. Additional Information If you have questions, please visit the Frequently Asked Questions section of the [employer] website at [email or web address] [email or web address] Remember, [employer] is NOT to be used for urgent needs. For medical emergencies, dial 911. Now available from your iPhone and Android! Please provide this summary of care documentation to your next provider. Your primary care clinician is listed as Chris Ordaz. If you have any questions after today's visit, please call [phone].

## 2018-02-14 DIAGNOSIS — I10 ESSENTIAL HYPERTENSION: ICD-10-CM

## 2018-02-14 NOTE — TELEPHONE ENCOUNTER
Last office visit- 12/4/17  Next office visit- 6/7/18  Last refill- 9/29/17    Requested Prescriptions     Pending Prescriptions Disp Refills    valsartan (DIOVAN) 320 mg tablet 90 Tab 3

## 2018-02-15 RX ORDER — VALSARTAN 320 MG/1
TABLET ORAL
Qty: 90 TAB | Refills: 3 | Status: SHIPPED | OUTPATIENT
Start: 2018-02-15 | End: 2018-02-21 | Stop reason: DRUGHIGH

## 2018-02-20 ENCOUNTER — PATIENT MESSAGE (OUTPATIENT)
Dept: INTERNAL MEDICINE CLINIC | Age: 63
End: 2018-02-20

## 2018-02-20 DIAGNOSIS — I10 ESSENTIAL HYPERTENSION: Primary | ICD-10-CM

## 2018-02-21 RX ORDER — VALSARTAN AND HYDROCHLOROTHIAZIDE 320; 12.5 MG/1; MG/1
1 TABLET, FILM COATED ORAL DAILY
Qty: 90 TAB | Refills: 3 | Status: SHIPPED | OUTPATIENT
Start: 2018-02-21 | End: 2019-02-11 | Stop reason: SDUPTHER

## 2018-02-22 NOTE — TELEPHONE ENCOUNTER
From: Gregorio Shaikh  To: Radha Gaines MD  Sent: 2/20/2018 2:24 PM EST  Subject: Prescription Question    The diuretic prescription helped lower my blood pressure a bit, so I would like to replace my current medicine with the combination prescription.     Thank you,  Major Cobos

## 2018-06-01 NOTE — PROGRESS NOTES
Pt came into the office today for lab work. Per phlebotomist, the pt has declined to have her Vit D drawn. Nurse was requested to place a new set of orders without the Vit D included.

## 2018-06-02 LAB
ALBUMIN SERPL-MCNC: 4.4 G/DL (ref 3.6–4.8)
ALBUMIN/GLOB SERPL: 1.8 {RATIO} (ref 1.2–2.2)
ALP SERPL-CCNC: 64 IU/L (ref 39–117)
ALT SERPL-CCNC: 23 IU/L (ref 0–32)
AST SERPL-CCNC: 19 IU/L (ref 0–40)
BASOPHILS # BLD AUTO: 0 X10E3/UL (ref 0–0.2)
BASOPHILS NFR BLD AUTO: 1 %
BILIRUB SERPL-MCNC: 0.3 MG/DL (ref 0–1.2)
BUN SERPL-MCNC: 15 MG/DL (ref 8–27)
BUN/CREAT SERPL: 21 (ref 12–28)
CALCIUM SERPL-MCNC: 9.8 MG/DL (ref 8.7–10.3)
CHLORIDE SERPL-SCNC: 98 MMOL/L (ref 96–106)
CHOLEST SERPL-MCNC: 191 MG/DL (ref 100–199)
CO2 SERPL-SCNC: 24 MMOL/L (ref 18–29)
CREAT SERPL-MCNC: 0.7 MG/DL (ref 0.57–1)
EOSINOPHIL # BLD AUTO: 0.2 X10E3/UL (ref 0–0.4)
EOSINOPHIL NFR BLD AUTO: 5 %
ERYTHROCYTE [DISTWIDTH] IN BLOOD BY AUTOMATED COUNT: 13.1 % (ref 12.3–15.4)
GFR SERPLBLD CREATININE-BSD FMLA CKD-EPI: 107 ML/MIN/1.73
GFR SERPLBLD CREATININE-BSD FMLA CKD-EPI: 93 ML/MIN/1.73
GLOBULIN SER CALC-MCNC: 2.5 G/DL (ref 1.5–4.5)
GLUCOSE SERPL-MCNC: 84 MG/DL (ref 65–99)
HCT VFR BLD AUTO: 38.9 % (ref 34–46.6)
HDLC SERPL-MCNC: 52 MG/DL
HGB BLD-MCNC: 13 G/DL (ref 11.1–15.9)
IMM GRANULOCYTES # BLD: 0 X10E3/UL (ref 0–0.1)
IMM GRANULOCYTES NFR BLD: 0 %
INTERPRETATION, 910389: NORMAL
LDLC SERPL CALC-MCNC: 74 MG/DL (ref 0–99)
LYMPHOCYTES # BLD AUTO: 1.4 X10E3/UL (ref 0.7–3.1)
LYMPHOCYTES NFR BLD AUTO: 29 %
MCH RBC QN AUTO: 31.4 PG (ref 26.6–33)
MCHC RBC AUTO-ENTMCNC: 33.4 G/DL (ref 31.5–35.7)
MCV RBC AUTO: 94 FL (ref 79–97)
MONOCYTES # BLD AUTO: 0.4 X10E3/UL (ref 0.1–0.9)
MONOCYTES NFR BLD AUTO: 9 %
NEUTROPHILS # BLD AUTO: 2.7 X10E3/UL (ref 1.4–7)
NEUTROPHILS NFR BLD AUTO: 56 %
PLATELET # BLD AUTO: 233 X10E3/UL (ref 150–379)
POTASSIUM SERPL-SCNC: 4.4 MMOL/L (ref 3.5–5.2)
PROT SERPL-MCNC: 6.9 G/DL (ref 6–8.5)
RBC # BLD AUTO: 4.14 X10E6/UL (ref 3.77–5.28)
SODIUM SERPL-SCNC: 139 MMOL/L (ref 134–144)
TRIGL SERPL-MCNC: 323 MG/DL (ref 0–149)
TSH SERPL DL<=0.005 MIU/L-ACNC: 1.45 UIU/ML (ref 0.45–4.5)
VLDLC SERPL CALC-MCNC: 65 MG/DL (ref 5–40)
WBC # BLD AUTO: 4.8 X10E3/UL (ref 3.4–10.8)

## 2018-06-07 ENCOUNTER — OFFICE VISIT (OUTPATIENT)
Dept: INTERNAL MEDICINE CLINIC | Age: 63
End: 2018-06-07

## 2018-06-07 VITALS
HEIGHT: 65 IN | DIASTOLIC BLOOD PRESSURE: 78 MMHG | WEIGHT: 160.9 LBS | TEMPERATURE: 97.8 F | RESPIRATION RATE: 15 BRPM | HEART RATE: 109 BPM | SYSTOLIC BLOOD PRESSURE: 138 MMHG | OXYGEN SATURATION: 97 % | BODY MASS INDEX: 26.81 KG/M2

## 2018-06-07 DIAGNOSIS — E78.00 HYPERCHOLESTEROLEMIA: ICD-10-CM

## 2018-06-07 DIAGNOSIS — I10 ESSENTIAL HYPERTENSION: ICD-10-CM

## 2018-06-07 DIAGNOSIS — E78.1 HIGH TRIGLYCERIDES: ICD-10-CM

## 2018-06-07 DIAGNOSIS — Z12.11 COLON CANCER SCREENING: ICD-10-CM

## 2018-06-07 DIAGNOSIS — Z12.39 SCREENING FOR BREAST CANCER: Primary | ICD-10-CM

## 2018-06-07 RX ORDER — VALSARTAN 320 MG/1
TABLET ORAL
COMMUNITY
Start: 2018-03-12 | End: 2018-06-07

## 2018-06-07 RX ORDER — SULFACETAMIDE SODIUM AND SULFUR 10; 5 MG/G; MG/G
RINSE TOPICAL 2 TIMES DAILY
COMMUNITY
Start: 2018-05-15

## 2018-06-07 RX ORDER — CLINDAMYCIN PHOSPHATE AND BENZOYL PEROXIDE 10; 37.5 MG/G; MG/G
GEL TOPICAL DAILY
COMMUNITY
Start: 2018-05-11

## 2018-06-07 RX ORDER — TAZAROTENE 0.05 MG/G
CREAM CUTANEOUS DAILY
COMMUNITY
Start: 2018-05-11

## 2018-06-07 NOTE — PROGRESS NOTES
Hypertension (6 month follow up) and Cholesterol Problem       HPI:  Myla Dsouza is a 58y.o. year old female who is here for a follow up visit. She was last seen by me 6 months ago. She reports the following:      Decided against spironolactone    High TG    Wt Readings from Last 3 Encounters:   06/07/18 160 lb 14.4 oz (73 kg)   12/04/17 156 lb 9.6 oz (71 kg)   04/26/17 155 lb (70.3 kg)      Weight gain level    Will do colonoscopy after august  Will do mammogram. Pt aware        Assessment and Plan        1. Screening for breast cancer  Strongly advised. She gets at [facility] MAMMO BI SCREENING INCL CAD; Future    2. Colon cancer screening  Referral given to patient and emphasized importance for patient to schedule the appointment by calling the number on the paperwork. If there is any difficulty getting an appointment, please let us know. Not making this appointment can have serious consequences of delayed diagnosis or irreversible health defects.   - REFERRAL FOR COLONOSCOPY    3. Essential hypertension  Tolerating medication. Denies dizziness that is positional, SOB, or chest pain. Understands the importance of compliance to reduce risk of future heart failure. Agreed to call if any of above symptoms develop and  stay on current regimen of  Valsartan hct  Losing 5 lbs and exercise will get to goal of     4. High triglycerides  Likely genetic as she eats low carbs. Start fish oil bid    5. Hypercholesterolemia  The nature of cardiac risk has been fully discussed with this patient. I have made her aware of her LDL target goal given her cardiovascular risk analysis. I have discussed the appropriate diet. The need for lifelong compliance in order to reduce risk is stressed. A regular exercise program is recommended to help achieve and maintain normal body weight, fitness and improve lipid balance. The risks and benefits of medications were discussed.     Last cholesterol labs reviewed with patient. Patient made aware to get liver checked every 6 months. Continue with    Key Antihyperlipidemia Meds             simvastatin (ZOCOR) 20 mg tablet  (Taking) TAKE 1 TABLET NIGHTLY               Visit Vitals    /78 (BP 1 Location: Left arm, BP Patient Position: Sitting)    Pulse (!) 109    Temp 97.8 °F (36.6 °C) (Oral)    Resp 15    Ht 5' 5\" (1.651 m)    Wt 160 lb 14.4 oz (73 kg)    SpO2 97%    BMI 26.78 kg/m2       Historical Data    Past Medical History:   Diagnosis Date    Cat allergies 7/10/2014    Essential hypertension 10/15/2015    Hypercholesterolemia 8/13/2013    Sleep apnea     \"mild\"    Vitamin D deficiency 12/4/2017       Past Surgical History:   Procedure Laterality Date    HX HYSTERECTOMY      (partial)       Outpatient Encounter Prescriptions as of 6/7/2018   Medication Sig Dispense Refill    ONEXTON 1.2 %(1 % base) -3.75 % glwp       sulfacetamide sodium-sulfur 10-5 % (w/w) clsr       TAZORAC 0.05 % topical cream       valsartan-hydroCHLOROthiazide (DIOVAN-HCT) 320-12.5 mg per tablet Take 1 Tab by mouth daily. 90 Tab 3    simvastatin (ZOCOR) 20 mg tablet TAKE 1 TABLET NIGHTLY 90 Tab 3    FEXOFENADINE HCL (ALLEGRA ALLERGY PO) Take  by mouth.  triamcinolone (NASACORT AQ) 55 mcg nasal inhaler 2 Sprays daily.  multivitamin (ONE A DAY) tablet Take 1 Tab by mouth daily.  valsartan (DIOVAN) 320 mg tablet        No facility-administered encounter medications on file as of 6/7/2018. No Known Allergies     Social History     Social History    Marital status:      Spouse name: N/A    Number of children: N/A    Years of education: N/A     Occupational History    Not on file.      Social History Main Topics    Smoking status: Former Smoker     Quit date: 1/1/1982    Smokeless tobacco: Never Used    Alcohol use 3.0 oz/week     6 Standard drinks or equivalent per week      Comment: 1-2 a night    Drug use: No    Sexual activity: No Other Topics Concern    Not on file     Social History Narrative        family history includes Elevated Lipids in her father and mother; Stroke (age of onset: 80) in her father. Review of Systems   Constitutional: Negative for chills, diaphoresis, fever, malaise/fatigue and weight loss. HENT: Negative for hearing loss. Respiratory: Negative for cough. Cardiovascular: Negative for chest pain. Gastrointestinal: Negative for blood in stool and constipation. Genitourinary: Negative for dysuria, flank pain, frequency and urgency. Musculoskeletal: Negative for myalgias. Skin: Negative for rash. Neurological: Negative for dizziness, weakness and headaches. Endo/Heme/Allergies: Does not bruise/bleed easily. Physical Exam   Constitutional: She appears well-developed and well-nourished. She is active. Non-toxic appearance. She does not have a sickly appearance. She does not appear ill. No distress. Eyes: Conjunctivae are normal. Right eye exhibits no discharge. Cardiovascular: Normal rate, regular rhythm, S1 normal, S2 normal, normal heart sounds and normal pulses. Exam reveals no gallop and no friction rub. Pulmonary/Chest: Effort normal and breath sounds normal. No respiratory distress. Abdominal: Soft. Bowel sounds are normal.   Musculoskeletal: She exhibits no edema or deformity. Neurological: She is alert. Skin: Skin is warm and dry. No rash noted. No pallor. Psychiatric: She has a normal mood and affect. Her behavior is normal.   Vitals reviewed. Ortho Exam      Orders Placed This Encounter    ONEXTON 1.2 %(1 % base) -3.75 % glwp    sulfacetamide sodium-sulfur 10-5 % (w/w) clsr    TAZORAC 0.05 % topical cream    valsartan (DIOVAN) 320 mg tablet        I have reviewed the patient's medical history in detail and updated the computerized patient record.      We had a prolonged discussion about these complex clinical issues and went over the various important aspects to consider. All questions were answered. Advised her to call back or return to office if symptoms do not improve, change in nature, or persist.    She was given an after visit summary or informed of [employer] Access which includes patient instructions, diagnoses, current medications, & vitals. She expressed understanding with the diagnosis and plan.

## 2018-06-07 NOTE — PROGRESS NOTES
Chief Complaint   Patient presents with    Hypertension     6 month follow up    Cholesterol Problem     1. Have you been to the ER, urgent care clinic since your last visit? Hospitalized since your last visit? No    2. Have you seen or consulted any other health care providers outside of the [address] [place] [address] since your last visit? Include any pap smears or colon screening.  No     Health Maintenance Due   Topic Date Due    COLONOSCOPY  06/12/1973    BREAST CANCER SCRN MAMMOGRAM  06/12/2005

## 2018-07-17 ENCOUNTER — OFFICE VISIT (OUTPATIENT)
Dept: INTERNAL MEDICINE CLINIC | Age: 63
End: 2018-07-17

## 2018-07-17 VITALS
OXYGEN SATURATION: 96 % | HEIGHT: 65 IN | DIASTOLIC BLOOD PRESSURE: 70 MMHG | SYSTOLIC BLOOD PRESSURE: 132 MMHG | WEIGHT: 161.8 LBS | HEART RATE: 108 BPM | TEMPERATURE: 96.5 F | RESPIRATION RATE: 16 BRPM | BODY MASS INDEX: 26.96 KG/M2

## 2018-07-17 DIAGNOSIS — Z01.818 PRE-OP EVALUATION: Primary | ICD-10-CM

## 2018-07-17 DIAGNOSIS — H26.9 CATARACT OF BOTH EYES, UNSPECIFIED CATARACT TYPE: ICD-10-CM

## 2018-07-17 RX ORDER — CETIRIZINE HCL 10 MG
TABLET ORAL
COMMUNITY
End: 2018-12-06

## 2018-07-17 NOTE — MR AVS SNAPSHOT
[address] [facility], [address] [place] 
[phone] Patient: Jenny Aguilar MRN: [id number] LMM:[id number] Visit Information Date & Time Provider Department Dept. Phone Encounter #  
 7/17/2018  8:20 AM [facility]YENNE Perry [facility] [phone] [id number] Your Appointments 12/6/2018  8:15 AM  
Complete Physical with MD Surinder Fernandez [facility] [address] [facility]) Appt Note: [id number] [facility], [address] [place]  
[address], [place] K[address] [facility] [phone] Upcoming Health Maintenance Date Due COLONOSCOPY 6/12/1973 BREAST CANCER SCRN MAMMOGRAM 6/12/2005 Influenza Age 5 to Adult 8/1/2018 PAP AKA CERVICAL CYTOLOGY 12/15/2019 DTaP/Tdap/Td series (3 - Td) 10/6/2025 Allergies as of 7/17/2018  Review Complete On: 7/17/2018 By: Martín Bernal NP Severity Noted Reaction Type Reactions Adhesive Tape-silicones Medium 39/28/1756   Topical Contact Dermatitis Bandaids Current Immunizations  Reviewed on 10/31/2017 Name Date Influenza Vaccine 10/21/2017, 10/27/2016, 10/6/2015, 10/31/2014, 11/5/2013  8:45 AM  
 Pneumococcal Polysaccharide (PPSV-23) 11/30/2016 TDAP Vaccine 3/28/2012 Tdap 10/6/2015 Zoster Vaccine, Live 7/22/2017 Not reviewed this visit You Were Diagnosed With   
  
 Codes Comments Pre-op evaluation    -  Primary ICD-10-CM: D57.506 ICD-9-CM: V72.84 Cataract of both eyes, unspecified cataract type     ICD-10-CM: H26.9 ICD-9-CM: 366.9 Vitals BP Pulse Temp Resp Height(growth percentile) Weight(growth percentile) 132/70 (BP 1 Location: Left arm, BP Patient Position: Sitting) (!) 108 96.5 °F (35.8 °C) (Oral) 16 5' 5\" (1.651 m) 161 lb 12.8 oz (73.4 kg) SpO2 BMI OB Status Smoking Status 96% 26.92 kg/m2 Hysterectomy Former Smoker Vitals History BMI and BSA Data Body Mass Index Body Surface Area  
 26.92 kg/m 2 1.83 m 2 Preferred Pharmacy Pharmacy Name Phone  [address] [phone] Your Updated Medication List  
  
   
This list is accurate as of 7/17/18  8:53 AM.  Always use your most recent med list. ALLEGRA ALLERGY PO Take 180 mg by mouth daily. multivitamin tablet Commonly known as:  ONE A DAY Take 1 Tab by mouth daily. ONEXTON 1.2 %(1 % base) -3.75 % Glwp Generic drug:  clindamycin-benzoyl peroxide Apply  to affected area daily. simvastatin 20 mg tablet Commonly known as:  ZOCOR  
TAKE 1 TABLET NIGHTLY  
  
 sulfacetamide sodium-sulfur 10-5 % (w/w) Clsr Apply  to affected area two (2) times a day. TAZORAC 0.05 % topical cream  
Generic drug:  tazorotene Apply  to affected area daily. triamcinolone 55 mcg nasal inhaler Commonly known as:  NASACORT AQ  
2 Sprays daily. valsartan-hydroCHLOROthiazide 320-12.5 mg per tablet Commonly known as:  DIOVAN-HCT Take 1 Tab by mouth daily. ZyrTEC 10 mg tablet Generic drug:  cetirizine Take  by mouth. Introducing [facility]! Dear Alfie Setting: Thank you for requesting a [employer] account. Our records indicate that you already have an active [employer] account. You can access your account anytime at [email or web address] Did you know that you can access your hospital and ER discharge instructions at any time in [employer]? You can also review all of your test results from your hospital stay or ER visit. Additional Information If you have questions, please visit the Frequently Asked Questions section of the [employer] website at [email or web address] Remember, [employer] is NOT to be used for urgent needs. For medical emergencies, dial 911. Now available from your iPhone and Android! Please provide this summary of care documentation to your next provider. Your primary care clinician is listed as Lakeshia Jim. If you have any questions after today's visit, please call [phone].

## 2018-07-17 NOTE — PROGRESS NOTES
62 yo female in for pre op eval for Cataract Ext OS on 8/8/18 and OD 8/22/18 by Dr. Gladys Coto at [facility]. See scanned in form.

## 2018-07-17 NOTE — PROGRESS NOTES
Catie Arora is a 61 y.o. female  Chief Complaint   Patient presents with    Pre-op Exam     cataract surgery, 8/8/18 and 8/22/18     Visit Vitals    /70 (BP 1 Location: Left arm, BP Patient Position: Sitting)    Pulse (!) 108    Temp 96.5 °F (35.8 °C) (Oral)    Resp 16    Ht 5' 5\" (1.651 m)    Wt 161 lb 12.8 oz (73.4 kg)    SpO2 96%    BMI 26.92 kg/m2     1. Have you been to the ER, urgent care clinic since your last visit? Hospitalized since your last visit? No    2. Have you seen or consulted any other health care providers outside of the [address] [place] [address] since your last visit? Include any pap smears or colon screening.  Dermatologist, GYN  Health Maintenance Due   Topic Date Due    COLONOSCOPY  06/12/1973    BREAST CANCER SCRN MAMMOGRAM  06/12/2005

## 2018-11-07 RX ORDER — SIMVASTATIN 20 MG/1
TABLET, FILM COATED ORAL
Qty: 90 TAB | Refills: 3 | Status: SHIPPED | OUTPATIENT
Start: 2018-11-07 | End: 2019-10-23 | Stop reason: SDUPTHER

## 2018-12-06 ENCOUNTER — OFFICE VISIT (OUTPATIENT)
Dept: INTERNAL MEDICINE CLINIC | Age: 63
End: 2018-12-06

## 2018-12-06 VITALS
HEIGHT: 65 IN | TEMPERATURE: 99.3 F | SYSTOLIC BLOOD PRESSURE: 122 MMHG | BODY MASS INDEX: 27.63 KG/M2 | DIASTOLIC BLOOD PRESSURE: 78 MMHG | RESPIRATION RATE: 18 BRPM | WEIGHT: 165.8 LBS | HEART RATE: 89 BPM | OXYGEN SATURATION: 98 %

## 2018-12-06 DIAGNOSIS — E78.00 HYPERCHOLESTEROLEMIA: ICD-10-CM

## 2018-12-06 DIAGNOSIS — Z00.00 ROUTINE GENERAL MEDICAL EXAMINATION AT A HEALTH CARE FACILITY: Primary | ICD-10-CM

## 2018-12-06 DIAGNOSIS — I10 ESSENTIAL HYPERTENSION: ICD-10-CM

## 2018-12-06 DIAGNOSIS — R53.83 FATIGUE, UNSPECIFIED TYPE: ICD-10-CM

## 2018-12-06 NOTE — PROGRESS NOTES
HPI: 
Judith Martin is a 61y.o. year old female who is here for an annual physical: 
LAST SEEN: 6 months ago Didn't get mammogram again Didn't get colonoscopy (aware of screening for colon cancer) Hypertension Patient has a history of HTN. The patient is taking hypertensive medications compliantly without side effects. Denies chest pain, dyspnea, edema, or symptoms of TIA's. BP Readings from Last 3 Encounters:  
12/06/18 122/78  
07/17/18 132/70  
06/07/18 138/78 Hyperlipidemia Patient has history of hyperlipidemia that is being managed with medications. She has been taking her statin cholesterol medication regularly without side effects such as myalgias or upper abdominal pain, nausea or jaundice. Lab Results Component Value Date/Time Cholesterol, total 189 11/29/2018 08:23 AM  
 HDL Cholesterol 50 11/29/2018 08:23 AM  
 LDL, calculated 105 (H) 11/29/2018 08:23 AM  
 VLDL, calculated 34 11/29/2018 08:23 AM  
 Triglyceride 169 (H) 11/29/2018 08:23 AM  
  
  
Stopped drinking 2 weeks ago. TG went down. Sleeping ok. But still tired when waking up. Going on for at least a week. Fatigue the whole day. In the last week,  Took a walk. Didn't have to stop. No change in weight. 2 cups of coffee in am.   
 
No constipation, appetite is good. No cold intolerance. Vegetarian. Wt Readings from Last 3 Encounters:  
12/06/18 165 lb 12.8 oz (75.2 kg) 07/17/18 161 lb 12.8 oz (73.4 kg) 06/07/18 160 lb 14.4 oz (73 kg) Temp Readings from Last 3 Encounters:  
12/06/18 99.3 °F (37.4 °C) (Oral) 07/17/18 96.5 °F (35.8 °C) (Oral) 06/07/18 97.8 °F (36.6 °C) (Oral) BP Readings from Last 3 Encounters:  
12/06/18 122/78  
07/17/18 132/70  
06/07/18 138/78 Pulse Readings from Last 3 Encounters:  
12/06/18 89  
07/17/18 (!) 108  
06/07/18 (!) 109 She reports the following history and medical concerns:   
 
 
 
Assessment and Plan Problem: Adult Mental Health  Goal: Reports a decrease in thoughts of suicide and/or violence  Outcome: Outcome Met, Continue evaluating goal progress toward completion  Pt polite and cooperative. Denies suicidal thoughts and is able to remain safe. Is seen social with peers. Shares that she has been attending group programming. Pt is able to make needs known. Denies any further nursing needs at this time.        1. Routine general medical examination at a health care facility Well exam.  Stopped drinking. Vegetarian diet. b-complex 2. Hypercholesterolemia The nature of cardiac risk has been fully discussed with this patient. I have made her aware of her LDL target goal given her cardiovascular risk analysis. I have discussed the appropriate diet. The need for lifelong compliance in order to reduce risk is stressed. A regular exercise program is recommended to help achieve and maintain normal body weight, fitness and improve lipid balance. The risks and benefits of medications were discussed. Last cholesterol labs reviewed with patient. Patient made aware to get liver checked every 6 months. Continue with  zocor. 
- LIPID PANEL 
- TSH REFLEX TO T4 
- METABOLIC PANEL, COMPREHENSIVE 
- CBC WITH AUTOMATED DIFF 3. Essential hypertension Tolerating medication. Denies dizziness that is positional, SOB, or chest pain. Understands the importance of compliance to reduce risk of future heart failure. Agreed to call if any of above symptoms develop and  stay on current regimen of  valsartan 4. Fatigue, unspecified type Switch to allegra from zyrtec Humidifier at night Sleep hygiene discussion Likely transition off alcohol use at night Great job on [employer]. Exercise during day with walking. Stand up desk. Neck muscle tightness Please get colonoscopy and mammogram 
She has a GYN and will get appt. Historical Data Vitals:  
 12/06/18 0813 BP: 122/78 Pulse: 89 Resp: 18 Temp: 99.3 °F (37.4 °C) TempSrc: Oral  
SpO2: 98% Weight: 165 lb 12.8 oz (75.2 kg) Height: 5' 5\" (1.651 m) Past Medical History:  
Diagnosis Date  Cat allergies 7/10/2014  Essential hypertension 10/15/2015  Hypercholesterolemia 8/13/2013  Sleep apnea \"mild\"  Vitamin D deficiency 12/4/2017 Past Surgical History:  
Procedure Laterality Date [address] Ovaries intact Outpatient Encounter Medications as of 2018 Medication Sig Dispense Refill  simvastatin (ZOCOR) 20 mg tablet TAKE 1 TABLET NIGHTLY 90 Tab 3  cetirizine (ZYRTEC) 10 mg tablet Take  by mouth.  ONEXTON 1.2 %(1 % base) -3.75 % glwp Apply  to affected area daily.  sulfacetamide sodium-sulfur 10-5 % (w/w) clsr Apply  to affected area two (2) times a day.  TAZORAC 0.05 % topical cream Apply  to affected area daily.  valsartan-hydroCHLOROthiazide (DIOVAN-HCT) 320-12.5 mg per tablet Take 1 Tab by mouth daily. 90 Tab 3  FEXOFENADINE HCL (ALLEGRA ALLERGY PO) Take 180 mg by mouth daily.  triamcinolone (NASACORT AQ) 55 mcg nasal inhaler 2 Sprays daily.  multivitamin (ONE A DAY) tablet Take 1 Tab by mouth daily. No facility-administered encounter medications on file as of 2018. Allergies Allergen Reactions  Adhesive Tape-Silicones Contact Dermatitis Bandaids Social History Socioeconomic History  Marital status:  Spouse name: Not on file  Number of children: Not on file  Years of education: Not on file  Highest education level: Not on file Social Needs  Financial resource strain: Not on file  Food insecurity - worry: Not on file  Food insecurity - inability: Not on file  Transportation needs - medical: Not on file  Transportation needs - non-medical: Not on file Occupational History  Not on file Tobacco Use  Smoking status: Former Smoker Last attempt to quit: 1982 Years since quittin.9  Smokeless tobacco: Never Used Substance and Sexual Activity  Alcohol use: Yes Alcohol/week: 3.0 oz Types: 6 Standard drinks or equivalent per week Comment: 1-2 a night  Drug use: No  
 Sexual activity: No  
Other Topics Concern  Not on file Social History Narrative  Not on file  
  
 
family history includes Elevated Lipids in her father and mother; Stroke (age of onset: 80) in her father. Review of Systems Constitutional: Positive for malaise/fatigue. Negative for chills, diaphoresis, fever and weight loss. HENT: Negative for congestion and hearing loss. Respiratory: Negative for cough. Cardiovascular: Negative for chest pain. Gastrointestinal: Negative for blood in stool and constipation. Genitourinary: Negative for dysuria, flank pain, frequency and urgency. Musculoskeletal: Negative for joint pain and myalgias. Skin: Negative for rash. Neurological: Negative for dizziness, weakness and headaches. Endo/Heme/Allergies: Does not bruise/bleed easily. Psychiatric/Behavioral: Negative for depression. The patient does not have insomnia. Visit Vitals /78 (BP Patient Position: Sitting) Pulse 89 Temp 99.3 °F (37.4 °C) (Oral) Resp 18 Ht 5' 5\" (1.651 m) Wt 165 lb 12.8 oz (75.2 kg) SpO2 98% BMI 27.59 kg/m² Physical Exam  
Constitutional: She is oriented to person, place, and time and well-developed, well-nourished, and in no distress. No distress. HENT:  
Nose: Nose normal.  
Mouth/Throat: Oropharynx is clear and moist.  
Eyes: Conjunctivae and EOM are normal.  
Neck: Normal range of motion. Neck supple. No thyromegaly present. Cardiovascular: Normal rate, regular rhythm and normal heart sounds. Exam reveals no gallop and no friction rub. Pulmonary/Chest: Effort normal and breath sounds normal. No respiratory distress. She has no wheezes. She has no rales. Abdominal: Soft. Bowel sounds are normal. She exhibits no distension. There is no tenderness. Musculoskeletal: Normal range of motion. She exhibits no edema, tenderness or deformity. Lymphadenopathy:  
  She has no cervical adenopathy. Neurological: She is alert and oriented to person, place, and time. No cranial nerve deficit. Skin: Skin is warm and dry. No rash noted. Psychiatric: Affect and judgment normal.  
Vitals reviewed. Ortho Exam  
 
Assessment:  See Above for discussion/plan. Annual Physical completed. Counseling and risk factor reduction topics were discussed such as regular exercise, sleep habits. I have reviewed the patient's medical history in detail and updated the computerized patient record. We had a prolonged discussion about these complex clinical issues and went over the various important aspects to consider. All questions were answered. Advised her to call back or return to office if symptoms do not improve, change in nature, or persist. 
 
She was given an after visit summary or informed of [employer] Access which includes patient instructions, diagnoses, current medications, & vitals. She expressed understanding with the diagnosis and plan.

## 2018-12-06 NOTE — PATIENT INSTRUCTIONS
Ultrasonic humidifier. Switch to fexofenadine 180 mg once a day. B- Complex  Vitamin A vegetarian diet can be very healthy  vegetarians have a lower than normal incidence of heart disease and cancer and lower risks of obesity and diabetes. But the vegan diet, in which you eat only plant based foods  no cheese, eggs, honey, or any other animal-derived foods  does require you to think about the nutrients you might be missing. You can certainly get adequate amounts of protein from plant foods  whole grains, legumes, vegetables, seeds and nuts. Soy protein is equivalent to the protein you would get from meat, chicken, fish or eggs. Apart from that, the specific nutrients you should focus on include: 
 
Vitamin B12: Although this is found naturally only in foods from animal sources, you can get sufficient amounts from fortified breakfast cereals, fortified soy beverages and some types of haskinss yeast. Still, I recommend taking a supplement of  micrograms of B12 in the form of a good multivitamin, sublingual tablet, nasal spray or gel. Iron: Good vegetable sources include cereals, grains, legumes, dates, prunes, raisins and greens. You can help your body absorb iron by taking 200-250 mg of vitamin C, preferably in divided doses when you consume these foods or by including foods high in vitamin C when you eat iron-rich foods. Do not take iron supplements unless prescribed by a physician. Zinc: The best plant sources are grains, nuts, legumes and spinach. Calcium: Good sources include sesame seeds, collards, kale, broccoli, sea vegetables and orange juice and soy milk fortified with calcium. Look for tofu fortified with calcium. Women need 1,000-1,200 mg of calcium per day from all sources; men need only 500-700 mg from all sources and do not need to take calcium supplements.  Women who dont get adequate dietary calcium should take 500-700 mg of calcium in supplement form in two divided doses with meals (take magnesium with calcium in a two to one ratio  thats twice as much calcium as magnesium.) Essential fatty acids: By not eating fish, youre missing out on natural sources of omega 3 fatty acids. Instead, make sure to include in your diet whole or unrefined grain products plus walnuts, freshly ground flaxseeds, or hemp seeds. Be aware that the vegetarian sources of omega-3s are not as good as oily fish. Consider taking a fish oil supplement or, at least, an algae-derived supplement of DHA. Vitamin D: As a vegan, you wont get D from such dietary sources as fortified milk, eggs, salmon, tuna, mackerel and sardines. Some cereals are fortified with D, as are soy milk and fake meats, and our bodies make vitamin D with exposure to sunlight (still, most adults dont get nearly enough). I recommend a daily supplement of 2,000 IU of vitamin D3  cholecalciferol  for everyone (vegan or not).  
Tia Alva M.D.

## 2018-12-06 NOTE — PROGRESS NOTES
Reviewed record in preparation for visit and have obtained necessary documentation. Identified pt with two pt identifiers(name and ). Health Maintenance Due Topic  COLONOSCOPY  Shingrix Vaccine Age 50> (1 of 2)  BREAST CANCER SCRN MAMMOGRAM   
 
 
 
Chief Complaint Patient presents with  Complete Physical  
  
 
Wt Readings from Last 3 Encounters:  
18 165 lb 12.8 oz (75.2 kg) 18 161 lb 12.8 oz (73.4 kg) 18 160 lb 14.4 oz (73 kg) Temp Readings from Last 3 Encounters:  
18 96.5 °F (35.8 °C) (Oral) 18 97.8 °F (36.6 °C) (Oral) 17 97.6 °F (36.4 °C) (Oral) BP Readings from Last 3 Encounters:  
18 132/70  
18 138/78  
17 140/80 Pulse Readings from Last 3 Encounters:  
18 (!) 108  
18 (!) 109  
17 82 Learning Assessment: 
:  
 
Learning Assessment 2018 10/1/2015 7/10/2014 PRIMARY LEARNER Patient Patient Patient HIGHEST LEVEL OF EDUCATION - PRIMARY LEARNER  SOME COLLEGE SOME COLLEGE SOME COLLEGE  
BARRIERS PRIMARY LEARNER NONE NONE NONE  
CO-LEARNER CAREGIVER No No No  
PRIMARY LANGUAGE ENGLISH ENGLISH ENGLISH  NEED - No No  
LEARNER PREFERENCE PRIMARY DEMONSTRATION READING LISTENING  
LEARNING SPECIAL TOPICS - no no ANSWERED BY patient patient patient RELATIONSHIP SELF SELF SELF Depression Screening: 
:  
 
PHQ over the last two weeks 2018 Little interest or pleasure in doing things Not at all Feeling down, depressed, irritable, or hopeless Not at all Total Score PHQ 2 0 Fall Risk Assessment: 
:  
 
Fall Risk Assessment, last 12 mths 2018 Able to walk? Yes Fall in past 12 months? No  
 
 
Abuse Screening: 
:  
 
Abuse Screening Questionnaire 2018 10/1/2015 7/10/2014 Do you ever feel afraid of your partner? N N N Are you in a relationship with someone who physically or mentally threatens you?  N N N  
 Is it safe for you to go home? [facility] ([place], [place]) Coordination of Care Questionnaire: 
:  
 
1) Have you been to an emergency room, urgent care clinic since your last visit? no  
Hospitalized since your last visit? no          
 
2) Have you seen or consulted any other health care providers outside of [address] [place] [address] since your last visit? yes Dr. Teofilo Cota: 8/2018  (Include any pap smears or colon screenings in this section.) 3) Do you have an Advance Directive on file? no 
 
4) Are you interested in receiving information on Advance Directives? NO Patient is accompanied by self I have received verbal consent from Gertrude Dawn to discuss any/all medical information while they are present in the room.

## 2019-02-11 DIAGNOSIS — I10 ESSENTIAL HYPERTENSION: ICD-10-CM

## 2019-02-11 RX ORDER — VALSARTAN AND HYDROCHLOROTHIAZIDE 320; 12.5 MG/1; MG/1
1 TABLET, FILM COATED ORAL DAILY
Qty: 90 TAB | Refills: 3 | Status: SHIPPED | OUTPATIENT
Start: 2019-02-11 | End: 2019-12-09 | Stop reason: ALTCHOICE

## 2019-02-11 NOTE — TELEPHONE ENCOUNTER
PCP: Oren Hernández MD    Last appt: 12/6/2018  No future appointments. Requested Prescriptions     Pending Prescriptions Disp Refills    valsartan-hydroCHLOROthiazide (DIOVAN-HCT) 320-12.5 mg per tablet 90 Tab 3     Sig: Take 1 Tab by mouth daily.

## 2019-06-05 ENCOUNTER — OFFICE VISIT (OUTPATIENT)
Dept: INTERNAL MEDICINE CLINIC | Age: 64
End: 2019-06-05

## 2019-06-05 VITALS
SYSTOLIC BLOOD PRESSURE: 158 MMHG | OXYGEN SATURATION: 97 % | RESPIRATION RATE: 18 BRPM | TEMPERATURE: 96.6 F | HEIGHT: 65 IN | BODY MASS INDEX: 27.47 KG/M2 | DIASTOLIC BLOOD PRESSURE: 78 MMHG | WEIGHT: 164.9 LBS | HEART RATE: 84 BPM

## 2019-06-05 DIAGNOSIS — I10 ESSENTIAL HYPERTENSION: Primary | ICD-10-CM

## 2019-06-05 DIAGNOSIS — E55.9 VITAMIN D DEFICIENCY: ICD-10-CM

## 2019-06-05 DIAGNOSIS — E78.00 HYPERCHOLESTEROLEMIA: ICD-10-CM

## 2019-06-05 DIAGNOSIS — J30.89 PERENNIAL ALLERGIC RHINITIS: ICD-10-CM

## 2019-06-05 DIAGNOSIS — Z23 ENCOUNTER FOR IMMUNIZATION: ICD-10-CM

## 2019-06-05 RX ORDER — CHOLECALCIFEROL (VITAMIN D3) 125 MCG
CAPSULE ORAL
COMMUNITY

## 2019-06-05 NOTE — PROGRESS NOTES
1. Have you been to the ER, urgent care clinic since your last visit? Hospitalized since your last visit? No    2. Have you seen or consulted any other health care providers outside of the [address] [place] [address] since your last visit? Include any pap smears or colon screening.  No

## 2019-06-05 NOTE — PROGRESS NOTES
Subjective:      Joseph Buitrago is a 61 y.o. female who presents today to establish care    Previously followed with Dr. Webber Naldo  Has a son, 2 grandchildren. They live here in [place]    Works at the [employer] center-   Sits a lot, but tries to get up every 30 minutes and move around  No other exercise    HTN:  Taking valsartan-hctz  No chest pain, palpitations, dyspnea, headaches or dizziness    HLD: taking simvastatin  No myalgias    Seasonal Environmental Allergies:  Uses nasacort, takes either allegra or clairitin  Not as effective  Seem to be getting worse    Vit D Def:  Taking otc vit d3 2,000 international daily  Energy level pretty good    Cataract surgery last year  Doing well now    Follows a vegetarian diet  Does eat eggs and dairy  Doesn't cook a whole lot, eats out a fair amount    Last mammogram.  [facility]  Last Pap: Dr. Velma Hagen, s/p hysterectomy  Last colonoscopy:  05/2019 polypectomy, repeat in 5 years. No diarrhea, blood in stool  Tdap: UTD  Needs Shingrix    Patient Active Problem List    Diagnosis Date Noted    Vitamin D deficiency 12/04/2017    Perennial allergic rhinitis 10/15/2015    Essential hypertension 10/15/2015    Adult acne 10/15/2015    Bilateral knee pain 10/01/2015    Cat allergies 07/10/2014    Hypercholesterolemia 08/13/2013     Current Outpatient Medications   Medication Sig Dispense Refill    VITAMIN B COMPLEX PO Take  by mouth.  cholecalciferol, vitamin D3, (VITAMIN D3) 2,000 unit tab Take  by mouth.  varicella-zoster recombinant, PF, (SHINGRIX, PF,) 50 mcg/0.5 mL susr injection 0.5 mL by IntraMUSCular route once for 1 dose. Indications: Prevention of Shingles 0.5 mL 0    valsartan-hydroCHLOROthiazide (DIOVAN-HCT) 320-12.5 mg per tablet Take 1 Tab by mouth daily. 90 Tab 3    simvastatin (ZOCOR) 20 mg tablet TAKE 1 TABLET NIGHTLY 90 Tab 3    ONEXTON 1.2 %(1 % base) -3.75 % glwp Apply  to affected area daily.  sulfacetamide sodium-sulfur 10-5 % (w/w) clsr Apply  to affected area two (2) times a day.  TAZORAC 0.05 % topical cream Apply  to affected area daily.  FEXOFENADINE HCL (ALLEGRA ALLERGY PO) Take 180 mg by mouth daily.  triamcinolone (NASACORT AQ) 55 mcg nasal inhaler 2 Sprays daily.  multivitamin (ONE A DAY) tablet Take 1 Tab by mouth daily. Review of Systems    Pertinent items are noted in HPI. Objective:     Visit Vitals  /78 (BP 1 Location: Left arm, BP Patient Position: Sitting)   Pulse 84   Temp 96.6 °F (35.9 °C) (Oral)   Resp 18   Ht 5' 5.25\" (1.657 m)   Wt 164 lb 14.4 oz (74.8 kg)   SpO2 97%   BMI 27.23 kg/m²     General appearance: alert, cooperative, no distress, appears stated age  Head: Normocephalic, without obvious abnormality, atraumatic  Neck: supple, symmetrical, trachea midline, no adenopathy, thyroid: not enlarged, symmetric, no tenderness/mass/nodules, no carotid bruit and no JVD  Lungs: clear to auscultation bilaterally  Heart: regular rate and rhythm  Extremities: extremities normal, atraumatic, no cyanosis or edema, steady gait  Skin: Skin color, texture, turgor normal  Neurologic: Grossly normal  Psych: calm, cooperative, appropriate affect      Assessment/Plan:     1. Essential hypertension  - above goal, rushing  - at goal all other recent checks  - - cont valsartan-hctz  - TSH RFX ON ABNORMAL TO FREE T4  - METABOLIC PANEL, COMPREHENSIVE  - CBC WITH AUTOMATED DIFF    2. Hypercholesterolemia  - cont statin, vegetarian diet  - LIPID PANEL  - METABOLIC PANEL, COMPREHENSIVE    3. Perennial allergic rhinitis  - cont allergy management  - REFERRAL TO ALLERGY    4. Vitamin D deficiency  - cont vit d3 daily  - lab not covered by her insurance    5.  Encounter for immunization  - reviewed guidelines and recommendations with patient  - varicella-zoster recombinant, PF, (SHINGRIX, PF,) 50 mcg/0.5 mL susr injection; 0.5 mL by IntraMUSCular route once for 1 dose. Indications: Prevention of Shingles  Dispense: 0.5 mL; Refill: 0      Advised her to call back or return to office if symptoms worsen/change/persist.  Discussed expected course/resolution/complications of diagnosis in detail with patient. Medication risks/benefits/costs/interactions/alternatives discussed with patient. She was given an after visit summary which includes diagnoses, current medications, & vitals. She expressed understanding with the diagnosis and plan.     TERRENCE Fang

## 2019-10-23 RX ORDER — SIMVASTATIN 20 MG/1
20 TABLET, FILM COATED ORAL
Qty: 90 TAB | Refills: 3 | Status: SHIPPED | OUTPATIENT
Start: 2019-10-23 | End: 2020-12-20 | Stop reason: ALTCHOICE

## 2019-12-09 ENCOUNTER — OFFICE VISIT (OUTPATIENT)
Dept: INTERNAL MEDICINE CLINIC | Age: 64
End: 2019-12-09

## 2019-12-09 VITALS
BODY MASS INDEX: 27.83 KG/M2 | OXYGEN SATURATION: 98 % | DIASTOLIC BLOOD PRESSURE: 78 MMHG | SYSTOLIC BLOOD PRESSURE: 132 MMHG | HEART RATE: 105 BPM | HEIGHT: 64 IN | RESPIRATION RATE: 17 BRPM | WEIGHT: 163 LBS | TEMPERATURE: 98.3 F

## 2019-12-09 DIAGNOSIS — R53.83 FATIGUE, UNSPECIFIED TYPE: ICD-10-CM

## 2019-12-09 DIAGNOSIS — Z00.00 ROUTINE GENERAL MEDICAL EXAMINATION AT A HEALTH CARE FACILITY: Primary | ICD-10-CM

## 2019-12-09 DIAGNOSIS — E78.00 HYPERCHOLESTEROLEMIA: ICD-10-CM

## 2019-12-09 DIAGNOSIS — I10 ESSENTIAL HYPERTENSION: ICD-10-CM

## 2019-12-09 DIAGNOSIS — Z23 ENCOUNTER FOR IMMUNIZATION: ICD-10-CM

## 2019-12-09 DIAGNOSIS — E55.9 VITAMIN D DEFICIENCY: ICD-10-CM

## 2019-12-09 RX ORDER — VALSARTAN 320 MG/1
320 TABLET ORAL DAILY
Qty: 90 TAB | Refills: 0 | Status: SHIPPED | OUTPATIENT
Start: 2019-12-09 | End: 2020-03-09

## 2019-12-09 NOTE — PATIENT INSTRUCTIONS
Shingrix - need to get the 2 shot series Prevnar-13 (pneumococcal vaccine) Bring in a copy of your Advance Medical Directive for scanning into the computer 
__________________________ Vaccine Information Statement Influenza (Flu) Vaccine (Inactivated or Recombinant): What You Need to Know Many Vaccine Information Statements are available in [place] and other languages. See www.immunize.org/vis Hojas de información sobre vacunas están disponibles en español y en muchos otros idiomas. Visite www.immunize.org/vis 1. Why get vaccinated? Influenza vaccine can prevent influenza (flu). Flu is a contagious disease that spreads around the [place] every year, usually between October and May. Anyone can get the flu, but it is more dangerous for some people. Infants and young children, people 72years of age and older, pregnant women, and people with certain health conditions or a weakened immune system are at greatest risk of flu complications. Pneumonia, bronchitis, sinus infections and ear infections are examples of flu-related complications. If you have a medical condition, such as heart disease, cancer or diabetes, flu can make it worse. Flu can cause fever and chills, sore throat, muscle aches, fatigue, cough, headache, and runny or stuffy nose. Some people may have vomiting and diarrhea, though this is more common in children than adults. Each year thousands of people in the [facility] die from flu, and many more are hospitalized. Flu vaccine prevents millions of illnesses and flu-related visits to the doctor each year. 2. Influenza vaccines CDC recommends everyone 10months of age and older get vaccinated every flu season. Children 6 months through 6years of age may need 2 doses during a single flu season. Everyone else needs only 1 dose each flu season. It takes about 2 weeks for protection to develop after vaccination. There are many flu viruses, and they are always changing. Each year a new flu vaccine is made to protect against three or four viruses that are likely to cause disease in the upcoming flu season. Even when the vaccine doesnt exactly match these viruses, it may still provide some protection. Influenza vaccine does not cause flu. Influenza vaccine may be given at the same time as other vaccines. 3. Talk with your health care provider Tell your vaccine provider if the person getting the vaccine: 
 Has had an allergic reaction after a previous dose of influenza vaccine, or has any severe, life-threatening allergies.  Has ever had Guillain-Barré Syndrome (also called GBS). In some cases, your health care provider may decide to postpone influenza vaccination to a future visit. People with minor illnesses, such as a cold, may be vaccinated. People who are moderately or severely ill should usually wait until they recover before getting influenza vaccine. Your health care provider can give you more information. 4. Risks of a reaction  Soreness, redness, and swelling where shot is given, fever, muscle aches, and headache can happen after influenza vaccine.  There may be a very small increased risk of Guillain-Barré Syndrome (GBS) after inactivated influenza vaccine (the flu shot). Sohail Guerrero children who get the flu shot along with pneumococcal vaccine (PCV13), and/or DTaP vaccine at the same time might be slightly more likely to have a seizure caused by fever. Tell your health care provider if a child who is getting flu vaccine has ever had a seizure. People sometimes faint after medical procedures, including vaccination. Tell your provider if you feel dizzy or have vision changes or ringing in the ears. As with any medicine, there is a very remote chance of a vaccine causing a severe allergic reaction, other serious injury, or death. 5. What if there is a serious problem? An allergic reaction could occur after the vaccinated person leaves the clinic. If you see signs of a severe allergic reaction (hives, swelling of the face and throat, difficulty breathing, a fast heartbeat, dizziness, or weakness), call 9-1-1 and get the person to the nearest hospital. 
 
For other signs that concern you, call your health care provider. Adverse reactions should be reported to the Vaccine Adverse Event Reporting System (VAERS). Your health care provider will usually file this report, or you can do it yourself. Visit the VAERS website at www.vaers. hhs.gov or call 2-306.301.8809. VAERS is only for reporting reactions, and VAERS staff do not give medical advice. 6. The National Vaccine Injury Compensation Program 
 
The [facility] Vaccine Injury Compensation Program (VICP) is a federal program that was created to compensate people who may have been injured by certain vaccines. Visit the VICP website at www.hrsa.gov/vaccinecompensation or call 7-967.334.9812 to learn about the program and about filing a claim. There is a time limit to file a claim for compensation. 7. How can I learn more?  Ask your health care provider.  Call your local or state health department.  Contact the Centers for Disease Control and Prevention (CDC): 
- Call 7-606.906.4241 (0-102-MIW-INFO) or 
- Visit CDCs influenza website at www.cdc.gov/flu Vaccine Information Statement (Interim) Inactivated Influenza Vaccine 8/15/2019 
42 HOWARD Samuel [id number] Department of [facility] and [employer] Centers for Disease Control and Prevention Office Use Only

## 2019-12-09 NOTE — PROGRESS NOTES
Verified name and birth date for privacy precautions. Chart reviewed in preparation for today's visit. Chief Complaint   Patient presents with    Complete Physical     EKG          Health Maintenance Due   Topic    Shingrix Vaccine Age 49> (1 of 2)    Influenza Age 5 to Adult          Wt Readings from Last 3 Encounters:   12/09/19 163 lb (73.9 kg)   06/05/19 164 lb 14.4 oz (74.8 kg)   12/06/18 165 lb 12.8 oz (75.2 kg)     Temp Readings from Last 3 Encounters:   12/09/19 98.3 °F (36.8 °C) (Oral)   06/05/19 96.6 °F (35.9 °C) (Oral)   12/06/18 99.3 °F (37.4 °C) (Oral)     BP Readings from Last 3 Encounters:   12/09/19 132/78   06/05/19 158/78   12/06/18 122/78     Pulse Readings from Last 3 Encounters:   12/09/19 (!) 105   06/05/19 84   12/06/18 89         Learning Assessment:  :     Learning Assessment 6/5/2019 6/7/2018 10/1/2015 7/10/2014   PRIMARY LEARNER Patient Patient Patient Patient   HIGHEST LEVEL OF EDUCATION - PRIMARY LEARNER  SOME COLLEGE SOME COLLEGE SOME COLLEGE SOME COLLEGE   BARRIERS PRIMARY LEARNER - NONE NONE NONE   CO-LEARNER CAREGIVER - No No No   PRIMARY LANGUAGE ENGLISH ENGLISH ENGLISH ENGLISH    NEED - - No No   LEARNER PREFERENCE PRIMARY READING DEMONSTRATION READING LISTENING   LEARNING SPECIAL TOPICS - - no no   ANSWERED BY patient patient patient patient   RELATIONSHIP SELF SELF SELF SELF       Depression Screening:  :     3 most recent PHQ Screens 12/9/2019   Little interest or pleasure in doing things Not at all   Feeling down, depressed, irritable, or hopeless Not at all   Total Score PHQ 2 0       Fall Risk Assessment:  :     Fall Risk Assessment, last 12 mths 6/7/2018   Able to walk? Yes   Fall in past 12 months? No       Abuse Screening:  :     Abuse Screening Questionnaire 12/9/2019 6/5/2019 6/7/2018 10/1/2015 7/10/2014   Do you ever feel afraid of your partner? N N N N N   Are you in a relationship with someone who physically or mentally threatens you?  N N N N N Is it safe for you to go home?  Y Y Y Y Y       Coordination of Care Questionnaire:  :     1) Have you been to an emergency room, urgent care clinic since your last visit? no   Hospitalized since your last visit? no             2) Have you seen or consulted any other health care providers outside of [address] [place] [address] since your last visit? no  (Include any pap smears or colon screenings in this section.)    3) Do you have an Advance Directive on file? no      Patient is currently accompanied by her self.     ------------------------------------------------

## 2019-12-09 NOTE — PROGRESS NOTES
60 yo female in for complete physical.   I am suddenly feeling old. She feels lack of exercise could be contributing, and she plans to change this. She gets tired more easily than she thinks is normal.  No palpitations, but does note she is heat intolerant. No constipation. Her Dermatologist wants to put her on Spironolactone, and suggests she come off the HCTZ. She would like to have the HCTZ  out from the Valsartan. ROS: HEENT : post nasal mucus   CP  : cough related to mucus   GI/Abd  : no GERD or bowel changes   /GYN : neg   MS/NP  : neg   Sleep  : when heavier, she was dx with sleep apnea. Wakes around 4 AM and can't get back to sleep - once a week.   Goes to bed around 10 PM.     Exercise : not currently    PMH, PSH, FH & SH  : reviewed/updated    Health Maintenance   Dental  : annually   Derm  : adult acne - Xu Factor   GYN   : Mart Finney   Ophth  : Aurelia Salinas   Mammo : 2019 and normal   DXA  : 2019 at GYN office - stable   Colonoscopy   : 2019 - initial - internal hemorrhoids - next in 5 years    Immunizations     Prevnar-13 - advised to get   Shingrix - advised to get   Influenza - today    Advance Medical Directive: states she has one and will bring in    PE: WNWD WF   Weight - stable   BP - 132/78    KRISTINA   Ears - canals - nl; TMs - dull   Phar-oral mucosa - post -pharynx - mildly inflamed (throat clearing)   Neck - no palp thyroid, no carotid bruits, no LA   Heart - RRR; no murmur   Lungs - clear   Abd - no M,T,O   LEs - no edema; DP & PT pulses = 2+   JAYCE - deferred (GYN exam and colonoscopy done this year)   EKG : normal    Imp: Encounter for complete physical   HTN   HLD   Hx Vit D def   Hx Osteopenia   Need for flu vaccine   Fatigue    Plan: Influenza vaccination today   Fasting Lab order given   Change to Valsartan 320 w/o HCTZ   BP check in 6 weeks   She plans to get Shingrix   Suggested Prevnar-13  ________________________________________  Expected course of current diagnosed problem(s) as well as expected progression and possible complications, and desired follow up with provider are discussed with patient. Patient is encouraged to be back in touch with any questions or concerns. Patient expresses understanding of plan of care. Patient is given AVS which includes diagnoses, current medications, vitals.

## 2020-01-15 LAB
25(OH)D3+25(OH)D2 SERPL-MCNC: 49 NG/ML (ref 30–100)
ALBUMIN SERPL-MCNC: 4.2 G/DL (ref 3.6–4.8)
ALBUMIN/GLOB SERPL: 1.6 {RATIO} (ref 1.2–2.2)
ALP SERPL-CCNC: 64 IU/L (ref 39–117)
ALT SERPL-CCNC: 41 IU/L (ref 0–32)
AST SERPL-CCNC: 26 IU/L (ref 0–40)
BASOPHILS # BLD AUTO: 0.1 X10E3/UL (ref 0–0.2)
BASOPHILS NFR BLD AUTO: 1 %
BILIRUB SERPL-MCNC: 0.4 MG/DL (ref 0–1.2)
BUN SERPL-MCNC: 14 MG/DL (ref 8–27)
BUN/CREAT SERPL: 20 (ref 12–28)
CALCIUM SERPL-MCNC: 9.6 MG/DL (ref 8.7–10.3)
CHLORIDE SERPL-SCNC: 104 MMOL/L (ref 96–106)
CHOLEST SERPL-MCNC: 169 MG/DL (ref 100–199)
CO2 SERPL-SCNC: 24 MMOL/L (ref 20–29)
CREAT SERPL-MCNC: 0.7 MG/DL (ref 0.57–1)
EOSINOPHIL # BLD AUTO: 0.2 X10E3/UL (ref 0–0.4)
EOSINOPHIL NFR BLD AUTO: 4 %
ERYTHROCYTE [DISTWIDTH] IN BLOOD BY AUTOMATED COUNT: 12.8 % (ref 11.7–15.4)
GLOBULIN SER CALC-MCNC: 2.6 G/DL (ref 1.5–4.5)
GLUCOSE SERPL-MCNC: 90 MG/DL (ref 65–99)
HCT VFR BLD AUTO: 37.6 % (ref 34–46.6)
HDLC SERPL-MCNC: 50 MG/DL
HGB BLD-MCNC: 12.7 G/DL (ref 11.1–15.9)
IMM GRANULOCYTES # BLD AUTO: 0 X10E3/UL (ref 0–0.1)
IMM GRANULOCYTES NFR BLD AUTO: 1 %
LDLC SERPL CALC-MCNC: 90 MG/DL (ref 0–99)
LYMPHOCYTES # BLD AUTO: 1.3 X10E3/UL (ref 0.7–3.1)
LYMPHOCYTES NFR BLD AUTO: 22 %
MCH RBC QN AUTO: 31.4 PG (ref 26.6–33)
MCHC RBC AUTO-ENTMCNC: 33.8 G/DL (ref 31.5–35.7)
MCV RBC AUTO: 93 FL (ref 79–97)
MONOCYTES # BLD AUTO: 0.6 X10E3/UL (ref 0.1–0.9)
MONOCYTES NFR BLD AUTO: 10 %
NEUTROPHILS # BLD AUTO: 3.5 X10E3/UL (ref 1.4–7)
NEUTROPHILS NFR BLD AUTO: 62 %
PLATELET # BLD AUTO: 237 X10E3/UL (ref 150–450)
POTASSIUM SERPL-SCNC: 4.6 MMOL/L (ref 3.5–5.2)
PROT SERPL-MCNC: 6.8 G/DL (ref 6–8.5)
RBC # BLD AUTO: 4.05 X10E6/UL (ref 3.77–5.28)
SODIUM SERPL-SCNC: 142 MMOL/L (ref 134–144)
TRIGL SERPL-MCNC: 144 MG/DL (ref 0–149)
TSH SERPL DL<=0.005 MIU/L-ACNC: 2 UIU/ML (ref 0.45–4.5)
VLDLC SERPL CALC-MCNC: 29 MG/DL (ref 5–40)
WBC # BLD AUTO: 5.6 X10E3/UL (ref 3.4–10.8)

## 2020-01-20 ENCOUNTER — OFFICE VISIT (OUTPATIENT)
Dept: INTERNAL MEDICINE CLINIC | Age: 65
End: 2020-01-20

## 2020-01-20 VITALS
DIASTOLIC BLOOD PRESSURE: 80 MMHG | TEMPERATURE: 98.3 F | HEIGHT: 64 IN | WEIGHT: 163.8 LBS | BODY MASS INDEX: 27.96 KG/M2 | OXYGEN SATURATION: 97 % | SYSTOLIC BLOOD PRESSURE: 144 MMHG | RESPIRATION RATE: 17 BRPM | HEART RATE: 78 BPM

## 2020-01-20 DIAGNOSIS — L70.9 ADULT ACNE: ICD-10-CM

## 2020-01-20 DIAGNOSIS — I10 ESSENTIAL HYPERTENSION: Primary | ICD-10-CM

## 2020-01-20 RX ORDER — SPIRONOLACTONE 25 MG/1
25 TABLET ORAL DAILY
Qty: 30 TAB | Refills: 1 | Status: SHIPPED | OUTPATIENT
Start: 2020-01-20 | End: 2020-04-14

## 2020-01-20 NOTE — PROGRESS NOTES
58 yo female returns for BP f/u after discontinuing HCTZ for BP management. Her Dermatologist wants her on Spironolactone for skin issues (adult acne). She uses several topicals for her face. She doesn't see the Derm again until June. She has been getting 130s/80 at home. PE: WNWD WF   Repeat BP = 144/80    Imp: HTN   Adult Acne    Plan: Continue Valsartan 320   Spironolactone 25 mg daily    F/U 6 weeks   Names given with whom to establish for on-going Primary Care  _________________________________  Expected course of current diagnosed problem(s) as well as expected progression and possible complications, and desired follow up with provider are discussed with patient. Patient is encouraged to be back in touch with any questions or concerns. Patient expresses understanding of plan of care. Patient is given AVS which includes diagnoses, current medications, vitals.

## 2020-01-20 NOTE — PATIENT INSTRUCTIONS
Your Primary Care Provider has left our practice, and we are unable to continue your care at this [facility] site. We would suggest establishing your on-going Primary Care with another [facility]. Several of these are: 
 
  Lynne LAWRENCE 2. @ [address] [place] [address]  [facility]  [place], [address] [facility]  [phone] [address] Mushtaq Shook @ [facility], [address] [place] [address]  [phone] [facility] @ [address] [place] [address] Please check with your insurance to be sure the chosen provider is in network with your current plan.

## 2020-01-20 NOTE — PROGRESS NOTES
Verified name and birth date for privacy precautions. Chart reviewed in preparation for today's visit. Chief Complaint   Patient presents with    Blood Pressure Check     Change to Valsartan 320mg w/o HCTZ          There are no preventive care reminders to display for this patient. Wt Readings from Last 3 Encounters:   01/20/20 163 lb 12.8 oz (74.3 kg)   12/09/19 163 lb (73.9 kg)   06/05/19 164 lb 14.4 oz (74.8 kg)     Temp Readings from Last 3 Encounters:   01/20/20 98.3 °F (36.8 °C) (Oral)   12/09/19 98.3 °F (36.8 °C) (Oral)   06/05/19 96.6 °F (35.9 °C) (Oral)     BP Readings from Last 3 Encounters:   01/20/20 146/74   12/09/19 132/78   06/05/19 158/78     Pulse Readings from Last 3 Encounters:   01/20/20 78   12/09/19 (!) 105   06/05/19 84         Learning Assessment:  :     Learning Assessment 6/5/2019 6/7/2018 10/1/2015 7/10/2014   PRIMARY LEARNER Patient Patient Patient Patient   HIGHEST LEVEL OF EDUCATION - PRIMARY LEARNER  SOME COLLEGE SOME COLLEGE SOME COLLEGE SOME COLLEGE   BARRIERS PRIMARY LEARNER - NONE NONE NONE   CO-LEARNER CAREGIVER - No No No   PRIMARY LANGUAGE ENGLISH ENGLISH ENGLISH ENGLISH    NEED - - No No   LEARNER PREFERENCE PRIMARY READING DEMONSTRATION READING LISTENING   LEARNING SPECIAL TOPICS - - no no   ANSWERED BY patient patient patient patient   RELATIONSHIP SELF SELF SELF SELF       Depression Screening:  :     3 most recent PHQ Screens 12/9/2019   Little interest or pleasure in doing things Not at all   Feeling down, depressed, irritable, or hopeless Not at all   Total Score PHQ 2 0       Fall Risk Assessment:  :     Fall Risk Assessment, last 12 mths 6/7/2018   Able to walk? Yes   Fall in past 12 months? No       Abuse Screening:  :     Abuse Screening Questionnaire 12/9/2019 6/5/2019 6/7/2018 10/1/2015 7/10/2014   Do you ever feel afraid of your partner? N N N N N   Are you in a relationship with someone who physically or mentally threatens you?  N N N N N Is it safe for you to go home?  Y Y Y Y Y       Coordination of Care Questionnaire:  :     1) Have you been to an emergency room, urgent care clinic since your last visit? no   Hospitalized since your last visit? no             2) Have you seen or consulted any other health care providers outside of [address] [place] [address] since your last visit? no  (Include any pap smears or colon screenings in this section.)    3) Do you have an Advance Directive on file? no      Patient is currently accompanied by her self.      ------------------------------------------------

## 2020-06-11 ENCOUNTER — VIRTUAL VISIT (OUTPATIENT)
Dept: FAMILY MEDICINE CLINIC | Age: 65
End: 2020-06-11

## 2020-06-11 DIAGNOSIS — E78.00 HYPERCHOLESTEROLEMIA: ICD-10-CM

## 2020-06-11 DIAGNOSIS — E55.9 VITAMIN D DEFICIENCY: ICD-10-CM

## 2020-06-11 DIAGNOSIS — Z76.89 ENCOUNTER TO ESTABLISH CARE: Primary | ICD-10-CM

## 2020-06-11 DIAGNOSIS — J30.81 CAT ALLERGIES: ICD-10-CM

## 2020-06-11 DIAGNOSIS — L70.9 ADULT ACNE: ICD-10-CM

## 2020-06-11 DIAGNOSIS — E66.3 OVERWEIGHT: ICD-10-CM

## 2020-06-11 DIAGNOSIS — J30.89 PERENNIAL ALLERGIC RHINITIS: ICD-10-CM

## 2020-06-11 DIAGNOSIS — I10 ESSENTIAL HYPERTENSION: ICD-10-CM

## 2020-06-11 RX ORDER — VALSARTAN 320 MG/1
TABLET ORAL
Qty: 90 TAB | Refills: 1 | Status: SHIPPED | OUTPATIENT
Start: 2020-06-11 | End: 2020-09-14 | Stop reason: SDUPTHER

## 2020-06-11 RX ORDER — SPIRONOLACTONE 25 MG/1
25 TABLET ORAL DAILY
Qty: 90 TAB | Refills: 1 | Status: SHIPPED | OUTPATIENT
Start: 2020-06-11 | End: 2020-10-02 | Stop reason: SDUPTHER

## 2020-06-11 NOTE — PROGRESS NOTES
Chief Complaint   Patient presents with    New Patient     establish care    Blood Pressure Check     medication refill      1. Have you been to the ER, urgent care clinic since your last visit? Hospitalized since your last visit? No    2. Have you seen or consulted any other health care providers outside of the [address] [place] [address] since your last visit? Include any pap smears or colon screening.  No

## 2020-06-11 NOTE — PATIENT INSTRUCTIONS
DASH Diet: Care Instructions Your Care Instructions The DASH diet is an eating plan that can help lower your blood pressure. DASH stands for Dietary Approaches to Stop Hypertension. Hypertension is high blood pressure. The DASH diet focuses on eating foods that are high in calcium, potassium, and magnesium. These nutrients can lower blood pressure. The foods that are highest in these nutrients are fruits, vegetables, low-fat dairy products, nuts, seeds, and legumes. But taking calcium, potassium, and magnesium supplements instead of eating foods that are high in those nutrients does not have the same effect. The DASH diet also includes whole grains, fish, and poultry. The DASH diet is one of several lifestyle changes your doctor may recommend to lower your high blood pressure. Your doctor may also want you to decrease the amount of sodium in your diet. Lowering sodium while following the DASH diet can lower blood pressure even further than just the DASH diet alone. Follow-up care is a key part of your treatment and safety. Be sure to make and go to all appointments, and call your doctor if you are having problems. It's also a good idea to know your test results and keep a list of the medicines you take. How can you care for yourself at home? Following the DASH diet · Eat 4 to 5 servings of fruit each day. A serving is 1 medium-sized piece of fruit, ½ cup chopped or canned fruit, 1/4 cup dried fruit, or 4 ounces (½ cup) of fruit juice. Choose fruit more often than fruit juice. · Eat 4 to 5 servings of vegetables each day. A serving is 1 cup of lettuce or raw leafy vegetables, ½ cup of chopped or cooked vegetables, or 4 ounces (½ cup) of vegetable juice. Choose vegetables more often than vegetable juice. · Get 2 to 3 servings of low-fat and fat-free dairy each day. A serving is 8 ounces of milk, 1 cup of yogurt, or 1 ½ ounces of cheese. · Eat 6 to 8 servings of grains each day. A serving is 1 slice of bread, 1 ounce of dry cereal, or ½ cup of cooked rice, pasta, or cooked cereal. Try to choose whole-grain products as much as possible. · Limit lean meat, poultry, and fish to 2 servings each day. A serving is 3 ounces, about the size of a deck of cards. · Eat 4 to 5 servings of nuts, seeds, and legumes (cooked dried beans, lentils, and split peas) each week. A serving is 1/3 cup of nuts, 2 tablespoons of seeds, or ½ cup of cooked beans or peas. · Limit fats and oils to 2 to 3 servings each day. A serving is 1 teaspoon of vegetable oil or 2 tablespoons of salad dressing. · Limit sweets and added sugars to 5 servings or less a week. A serving is 1 tablespoon jelly or jam, ½ cup sorbet, or 1 cup of lemonade. · Eat less than 2,300 milligrams (mg) of sodium a day. If you limit your sodium to 1,500 mg a day, you can lower your blood pressure even more. Tips for success · Start small. Do not try to make dramatic changes to your diet all at once. You might feel that you are missing out on your favorite foods and then be more likely to not follow the plan. Make small changes, and stick with them. Once those changes become habit, add a few more changes. · Try some of the following: ? Make it a goal to eat a fruit or vegetable at every meal and at snacks. This will make it easy to get the recommended amount of fruits and vegetables each day. ? Try yogurt topped with fruit and nuts for a snack or healthy dessert. ? Add lettuce, tomato, cucumber, and onion to sandwiches. ? Combine a ready-made pizza crust with low-fat mozzarella cheese and lots of vegetable toppings. Try using tomatoes, squash, spinach, broccoli, carrots, cauliflower, and onions. ? Have a variety of cut-up vegetables with a low-fat dip as an appetizer instead of chips and dip. ? Sprinkle sunflower seeds or chopped almonds over salads.  Or try adding chopped walnuts or almonds to cooked vegetables. ? Try some vegetarian meals using beans and peas. Add garbanzo or kidney beans to salads. Make burritos and tacos with mashed ruiz beans or black beans. Where can you learn more? Go to http://sparkle-richa.info/ Enter S870 in the search box to learn more about \"DASH Diet: Care Instructions. \" Current as of: December 16, 2019               Content Version: 12.5 © 4292-1360 [employer]. Care instructions adapted under license by [employer] (which disclaims liability or warranty for this information). If you have questions about a medical condition or this instruction, always ask your healthcare professional. [address] any warranty or liability for your use of this information.

## 2020-06-11 NOTE — PROGRESS NOTES
Tom Whitaker [facility] Note      Subjective:     Chief Complaint   Patient presents with    New Patient     establish care    Blood Pressure Check     medication refill      Tex Velazco is a 59y.o. year old female who presents for virtual evaluation of the following:      Establishment of Care:  Previous PCP: NP [place]  Patient Care Team:  Demian Cortes MD as PCP - General (Family Practice)  Jana Chang MD (Obstetrics & Gynecology)  Katerine Villa MD (Dermatology)  Nuha Melo NP [facility])   Dentist- Seen regularly  Optho- Seen regularly    PMH:   Hypertension with HLD  Chronic. Diagnosed > 5 years ago  Home monitorin/60 today  Treatment:   Key CAD CHF Meds             spironolactone (ALDACTONE) 25 mg tablet (Taking) Take 1 Tab by mouth daily. Need to establish with new PCP for further refills. valsartan (DIOVAN) 320 mg tablet (Taking) TAKE 1 TABLET DAILY    simvastatin (ZOCOR) 20 mg tablet (Taking) Take 1 Tab by mouth nightly. Previous Tx:  Not adhering to strict low salt diet  Complications: None  Co-morbidities: HLD  BP Readings from Last 3 Encounters:   20 144/80   19 132/78   19 158/78     Lab Results   Component Value Date/Time    Cholesterol, total 169 2020 08:14 AM    HDL Cholesterol 50 2020 08:14 AM    LDL, calculated 90 2020 08:14 AM    VLDL, calculated 29 2020 08:14 AM    Triglyceride 144 2020 08:14 AM       Cat Allergies/ Seasonal Allergies:   Chronic   Tx: allegra and Flonase prn  Planning to see an llergist    Acne:    Tc: musclt creams  Managed by dermatolgoy      Vitamin D Deifciency  Tc: OTC Vit D3 200IU daily  Lab Results   Component Value Date/Time    VITAMIN D, 25-HYDROXY 49.0 2020 08:14 AM         Overweight:   Diet: Unrestricted  Actrivity: none  Last Weight Metrics:  Weight Loss Metrics 2020   Today's Wt 163 lb 12.8 oz 163 lb 164 lb 14.4 oz 165 lb 12.8 oz 161 lb 12.8 oz 160 lb 14.4 oz 156 lb 9.6 oz   BMI 27.9 kg/m2 27.76 kg/m2 27.23 kg/m2 27.59 kg/m2 26.92 kg/m2 26.78 kg/m2 25.78 kg/m2     3 most recent PHQ Screens 2020   Little interest or pleasure in doing things Not at all   Feeling down, depressed, irritable, or hopeless Not at all   Total Score PHQ 2 0         Acute Concerns:  None      Social:   Employment- works as  for [employer] with alone. Has an adult son      Review of Systems   Pertinent positives and negative per HPI. All other systems  reviewed are negative for a Comprehensive ROS (10+). Past Medical History:   Diagnosis Date    Cat allergies 7/10/2014    Essential hypertension 10/15/2015    Hypercholesterolemia 2013    Sleep apnea     \"mild\"    Vitamin D deficiency 2017        Social History     Socioeconomic History    Marital status:      Spouse name: Not on file    Number of children: Not on file    Years of education: Not on file    Highest education level: Not on file   Occupational History    Not on file   Social Needs    Financial resource strain: Not on file    Food insecurity     Worry: Not on file     Inability: Not on file    Transportation needs     Medical: Not on file     Non-medical: Not on file   Tobacco Use    Smoking status: Former Smoker     Last attempt to quit: 1982     Years since quittin.4    Smokeless tobacco: Never Used   Substance and Sexual Activity    Alcohol use:  Yes     Alcohol/week: 14.0 standard drinks     Types: 14 Standard drinks or equivalent per week     Comment: 1-2 a night    Drug use: No    Sexual activity: Never   Lifestyle    Physical activity     Days per week: Not on file     Minutes per session: Not on file    Stress: Not on file   Relationships    Social connections     Talks on phone: Not on file     Gets together: Not on file     Attends [employer] service: Not on file     Active member of club or organization: Not on file     Attends meetings of clubs or organizations: Not on file     Relationship status: Not on file    Intimate partner violence     Fear of current or ex partner: Not on file     Emotionally abused: Not on file     Physically abused: Not on file     Forced sexual activity: Not on file   Other Topics Concern    Not on file   Social History Narrative    Not on file       Family History   Problem Relation Age of Onset    Elevated Lipids Mother     Alzheimer Mother     Elevated Lipids Father     Stroke Father 80    Hypertension Father     Breast Cancer Paternal Grandmother        Current Outpatient Medications   Medication Sig    spironolactone (ALDACTONE) 25 mg tablet Take 1 Tab by mouth daily. Need to establish with new PCP for further refills.  valsartan (DIOVAN) 320 mg tablet TAKE 1 TABLET DAILY    simvastatin (ZOCOR) 20 mg tablet Take 1 Tab by mouth nightly.  VITAMIN B COMPLEX PO Take  by mouth.  cholecalciferol, vitamin D3, (VITAMIN D3) 2,000 unit tab Take  by mouth.  ONEXTON 1.2 %(1 % base) -3.75 % glwp Apply  to affected area daily.  sulfacetamide sodium-sulfur 10-5 % (w/w) clsr Apply  to affected area two (2) times a day.  TAZORAC 0.05 % topical cream Apply  to affected area daily.  FEXOFENADINE HCL (ALLEGRA ALLERGY PO) Take 180 mg by mouth daily.  triamcinolone (NASACORT AQ) 55 mcg nasal inhaler 2 Sprays daily.  multivitamin (ONE A DAY) tablet Take 1 Tab by mouth daily. No current facility-administered medications for this visit. Objective:     Patient-Reported Vitals 6/11/2020   Patient-Reported Weight 160lb   Patient-Reported Height 5ft5   Patient-Reported Systolic  409   Patient-Reported Diastolic 60        Physical Examination:  General: Alert, cooperative, no distress, appears stated age. Overweight  Eyes: Conjunctivae clear. Pupils equally round. Extraocular muscles intact.   Ears: Normal appearing external ear   Nose: Nares normal appearing  Mouth/Throat: Lips, mucosa, and tongue normal. Moist mucous membranes. No tonsillar enlargement noted. Neck: Supple, symmetrical, trachea midline, no neck mass visualized. Respiratory: Breathing comfortably, in no acute respiratory distress. Cardiovascular: Visualized extremities without edema. MSK: Upper extremities normal appearing. Skin: No significant erythematous lesions or discoloration noted on facial skin. No rashes or lesions on exposed skin. Neurologic: No facial asymmetry. Normal gaze. Cranial nerves intact. Psychiatric: Affect appropriate. Mood euthymic. Thoughts logical. Speech volume and speed normal. No hallucinations. Well kempt. No visits with results within 3 Month(s) from this visit. Latest known visit with results is:   Office Visit on 12/09/2019   Component Date Value Ref Range Status    Cholesterol, total 01/14/2020 169  100 - 199 mg/dL Final    Triglyceride 01/14/2020 144  0 - 149 mg/dL Final    HDL Cholesterol 01/14/2020 50  >39 mg/dL Final    VLDL, calculated 01/14/2020 29  5 - 40 mg/dL Final    LDL, calculated 01/14/2020 90  0 - 99 mg/dL Final    WBC 01/14/2020 5.6  3.4 - 10.8 x10E3/uL Final    RBC 01/14/2020 4.05  3.77 - 5.28 x10E6/uL Final    HGB 01/14/2020 12.7  11.1 - 15.9 g/dL Final    HCT 01/14/2020 37.6  34.0 - 46.6 % Final    MCV 01/14/2020 93  79 - 97 fL Final    MCH 01/14/2020 31.4  26.6 - 33.0 pg Final    MCHC 01/14/2020 33.8  31.5 - 35.7 g/dL Final    RDW 01/14/2020 12.8  11.7 - 15.4 % Final                  **Please note reference interval change**    PLATELET 52/11/2464 269  150 - 450 x10E3/uL Final    NEUTROPHILS 01/14/2020 62  Not Estab. % Final    Lymphocytes 01/14/2020 22  Not Estab. % Final    MONOCYTES 01/14/2020 10  Not Estab. % Final    EOSINOPHILS 01/14/2020 4  Not Estab. % Final    BASOPHILS 01/14/2020 1  Not Estab. % Final    ABS.  NEUTROPHILS 01/14/2020 3.5  1.4 - 7.0 x10E3/uL Final    Abs Lymphocytes 01/14/2020 1.3  0.7 - 3.1 x10E3/uL Final    ABS. MONOCYTES 01/14/2020 0.6  0.1 - 0.9 x10E3/uL Final    ABS. EOSINOPHILS 01/14/2020 0.2  0.0 - 0.4 x10E3/uL Final    ABS. BASOPHILS 01/14/2020 0.1  0.0 - 0.2 x10E3/uL Final    IMMATURE GRANULOCYTES 01/14/2020 1  Not Estab. % Final    ABS. IMM. GRANS. 01/14/2020 0.0  0.0 - 0.1 x10E3/uL Final    Glucose 01/14/2020 90  65 - 99 mg/dL Final    BUN 01/14/2020 14  8 - 27 mg/dL Final    Creatinine 01/14/2020 0.70  0.57 - 1.00 mg/dL Final    GFR est non-AA 01/14/2020 92  >59 mL/min/1.73 Final    GFR est AA 01/14/2020 106  >59 mL/min/1.73 Final    BUN/Creatinine ratio 01/14/2020 20  12 - 28 Final    Sodium 01/14/2020 142  134 - 144 mmol/L Final    Potassium 01/14/2020 4.6  3.5 - 5.2 mmol/L Final    Chloride 01/14/2020 104  96 - 106 mmol/L Final    CO2 01/14/2020 24  20 - 29 mmol/L Final    Calcium 01/14/2020 9.6  8.7 - 10.3 mg/dL Final    Protein, total 01/14/2020 6.8  6.0 - 8.5 g/dL Final    Albumin 01/14/2020 4.2  3.6 - 4.8 g/dL Final    Comment:     **Effective January 20, 2020 Albumin reference**        interval will be changing to:                Age                Male          Female            0 -  7 days        3.6 - 4.9      3.6 - 4.9            8 - 30 days        3.4 - 4.7      3.4 - 4.7            1 -  6 month       3.7 - 4.8      3.7 - 4.8     7 months -  2 years       3.9 - 5.0      3.9 - 5.0            3 -  5 years       4.0 - 5.0      4.0 - 5.0            6 - 12 years       4.1 - 5.0      4.0 - 5.0           13 - 30 years       4.1 - 5.2      3.9 - 5.0           31 - 50 years       4.0 - 5.0      3.8 - 4.8           51 - 60 years       3.8 - 4.9      3.8 - 4.9           61 - 70 years       3.8 - 4.8      3.8 - 4.8           71 - 80 years       3.7 - 4.7      3.7 - 4.7           81 - 89 years       3.6 - 4.6      3.6 - 4.6               >89 years       3.5 - 4.6      3.5 - 4.6      GLOBULIN, TOTAL 01/14/2020 2.6  1.5 - 4.5 g/dL Final    A-G Ratio 01/14/2020 1.6  1.2 - 2.2 Final    Bilirubin, total 01/14/2020 0.4  0.0 - 1.2 mg/dL Final    Alk. phosphatase 01/14/2020 64  39 - 117 IU/L Final    AST (SGOT) 01/14/2020 26  0 - 40 IU/L Final    ALT (SGPT) 01/14/2020 41* 0 - 32 IU/L Final    VITAMIN D, 25-HYDROXY 01/14/2020 49.0  30.0 - 100.0 ng/mL Final    Comment: Vitamin D deficiency has been defined by the [address] practice guideline as a  level of serum 25-OH vitamin D less than 20 ng/mL (1,2). The Endocrine Society went on to further define vitamin D  insufficiency as a level between 21 and 29 ng/mL (2). 1. IOM ([place] of Medicine). 2010. Dietary reference     intakes for calcium and D. [facility]: The     [employer]. 2. Elroy MF, Colin HYATT, Natalie MONIQUE, et al.     Evaluation, treatment, and prevention of vitamin D     deficiency: an Endocrine Society clinical practice     guideline. JCEM. 2011 Jul; 96(7):1911-30.  TSH 01/14/2020 2.000  0.450 - 4.500 uIU/mL Final         Assessment/ Plan:   Diagnoses and all orders for this visit:    1. Encounter to establish care    2. Essential hypertension  -     spironolactone (ALDACTONE) 25 mg tablet; Take 1 Tab by mouth daily. Need to establish with new PCP for further refills. -     valsartan (DIOVAN) 320 mg tablet; TAKE 1 TABLET DAILY    3. Hypercholesterolemia    4. Adult acne  -     spironolactone (ALDACTONE) 25 mg tablet; Take 1 Tab by mouth daily. Need to establish with new PCP for further refills. 5. Perennial allergic rhinitis    6. Cat allergies    7. Vitamin D deficiency    8. Overweight      For today's visit, I did the following:  · Reviewed PMH as listed in orders  · Refilled meds for chronic conditions, per orders  Labs to eval end organ function and etiology of chronic/acute concerns. Blood pressure is well controlled on home reading. Continue current regimen.  DASH Diet recommended. Diet and lifestyle modification encouraged for weight loss and chronic disease prevention/ management. Negative depression screen. Follow up with specialists per routine. We discussed the expected course, resolution and complications of the diagnosis(es) in detail. Medication risks, benefits, costs, interactions, and alternatives were discussed as indicated. I advised her to contact the office if her condition worsens, changes or fails to improve as anticipated. She expressed understanding with the diagnosis(es) and plan. Osvaldo Morgan is a 59 y.o. female being evaluated by a video visit encounter for concerns as above. A caregiver was present when appropriate. Due to this being a TeleHealth encounter (During ZRQCR-00 public health emergency), evaluation of the following organ systems was limited: Vitals/Constitutional/EENT/Resp/CV/GI//MS/Neuro/Skin/Heme-Lymph-Imm. Pursuant to the emergency declaration under the [facility] waiver authority and the [employer] and [employer] Act, this Virtual  Visit was conducted, with patient's (and/or legal guardian's) consent, to reduce the patient's risk of exposure to COVID-19 and provide necessary medical care. Services were provided through a video synchronous discussion virtually to substitute for in-person clinic visit. Provider was at home while conducting this encounter. Patient was at home during encounter. Other persons participating in call: None  Consent:  She and/or her healthcare decision maker is aware that this patient-initiated Telehealth encounter is a billable service, with coverage as determined by her insurance carrier. She is aware that she may receive a bill and has provided verbal consent to proceed: Yes  This virtual visit was conducted via [employer]. me.   Pursuant to the emergency declaration under the [address] Emergencies Act, 1135 waiver authority and the Coronavirus Preparedness and Response Supplemental Appropriations Act, this Virtual  Visit was conducted to reduce the patient's risk of exposure to COVID-19 and provide continuity of care for an established patient. Services were provided through a video synchronous discussion virtually to substitute for in-person clinic visit. Due to this being a TeleHealth evaluation, many elements of the physical examination are unable to be assessed. Total Time: minutes: 21-30 minutes. Educated patient on red flag symptoms to warrant return to clinic or emergency room visit. I have discussed the diagnosis with the patient and the intended plan as seen in the above orders. The patient has been offered or received an after-visit summary and questions were answered concerning future plans. I have discussed medication side effects and warnings with the patient as well. Follow-up and Dispositions    · Return in about 6 months (around 12/11/2020) for Physical exam, Follow Up.          Signed,    Michael Dejesus MD  6/11/2020

## 2020-09-14 DIAGNOSIS — I10 ESSENTIAL HYPERTENSION: ICD-10-CM

## 2020-09-14 NOTE — TELEPHONE ENCOUNTER
MD Marina Easton,    Patient changing to [facility] from [place]. Last Visit: VV 6/11/20  MD Marina Easton  Next Appointment: Request for appt. by patient 9/2/20 thru my chart. No response yet from office.    Previous Refill Encounter(s): 6/11/20  90 + 1 (Sarah)    Requested Prescriptions     Pending Prescriptions Disp Refills    valsartan (DIOVAN) 320 mg tablet 90 Tab 1     Sig: TAKE 1 TABLET DAILY

## 2020-09-15 RX ORDER — VALSARTAN 320 MG/1
TABLET ORAL
Qty: 90 TAB | Refills: 1 | Status: SHIPPED | OUTPATIENT
Start: 2020-09-15 | End: 2021-01-05 | Stop reason: SDUPTHER

## 2020-10-02 DIAGNOSIS — L70.9 ADULT ACNE: ICD-10-CM

## 2020-10-02 DIAGNOSIS — I10 ESSENTIAL HYPERTENSION: ICD-10-CM

## 2020-10-02 NOTE — TELEPHONE ENCOUNTER
MD Brenna Ferreira,    Patient changing to [facility] mail order. Thanks, Lynnette    Last Visit: VV 6/11/20  MD Brenna Ferreira  Next Appointment: 12/14/20  MD Brenna Ferreira  Previous Refill Encounter(s): 6/11/20  90 + 1 (teja)    Requested Prescriptions     Pending Prescriptions Disp Refills    spironolactone (ALDACTONE) 25 mg tablet 90 Tab 1     Sig: Take 1 Tab by mouth daily.

## 2020-10-06 RX ORDER — SPIRONOLACTONE 25 MG/1
25 TABLET ORAL DAILY
Qty: 90 TAB | Refills: 1 | Status: SHIPPED | OUTPATIENT
Start: 2020-10-06 | End: 2020-12-20 | Stop reason: SDUPTHER

## 2020-12-14 ENCOUNTER — OFFICE VISIT (OUTPATIENT)
Dept: FAMILY MEDICINE CLINIC | Age: 65
End: 2020-12-14
Payer: COMMERCIAL

## 2020-12-14 VITALS
OXYGEN SATURATION: 98 % | DIASTOLIC BLOOD PRESSURE: 67 MMHG | SYSTOLIC BLOOD PRESSURE: 128 MMHG | HEIGHT: 64 IN | TEMPERATURE: 97.8 F | RESPIRATION RATE: 15 BRPM | HEART RATE: 74 BPM | WEIGHT: 163.6 LBS | BODY MASS INDEX: 27.93 KG/M2

## 2020-12-14 DIAGNOSIS — Z00.00 WELL WOMAN EXAM (NO GYNECOLOGICAL EXAM): Primary | ICD-10-CM

## 2020-12-14 DIAGNOSIS — E66.3 OVERWEIGHT: ICD-10-CM

## 2020-12-14 DIAGNOSIS — E87.5 SERUM POTASSIUM ELEVATED: ICD-10-CM

## 2020-12-14 DIAGNOSIS — I10 ESSENTIAL HYPERTENSION: ICD-10-CM

## 2020-12-14 DIAGNOSIS — Z23 ENCOUNTER FOR IMMUNIZATION: ICD-10-CM

## 2020-12-14 DIAGNOSIS — L70.9 ADULT ACNE: ICD-10-CM

## 2020-12-14 DIAGNOSIS — E78.5 HYPERLIPIDEMIA, UNSPECIFIED HYPERLIPIDEMIA TYPE: ICD-10-CM

## 2020-12-14 DIAGNOSIS — R79.89 ELEVATED SERUM CREATININE: ICD-10-CM

## 2020-12-14 LAB
ALBUMIN SERPL-MCNC: 3.8 G/DL (ref 3.5–5)
ALBUMIN/GLOB SERPL: 1.2 {RATIO} (ref 1.1–2.2)
ALP SERPL-CCNC: 66 U/L (ref 45–117)
ALT SERPL-CCNC: 36 U/L (ref 12–78)
ANION GAP SERPL CALC-SCNC: 4 MMOL/L (ref 5–15)
AST SERPL-CCNC: 17 U/L (ref 15–37)
BASOPHILS # BLD: 0.1 K/UL (ref 0–0.1)
BASOPHILS NFR BLD: 1 % (ref 0–1)
BILIRUB SERPL-MCNC: 0.4 MG/DL (ref 0.2–1)
BUN SERPL-MCNC: 30 MG/DL (ref 6–20)
BUN/CREAT SERPL: 28 (ref 12–20)
CALCIUM SERPL-MCNC: 9.3 MG/DL (ref 8.5–10.1)
CHLORIDE SERPL-SCNC: 113 MMOL/L (ref 97–108)
CHOLEST SERPL-MCNC: 219 MG/DL
CO2 SERPL-SCNC: 24 MMOL/L (ref 21–32)
CREAT SERPL-MCNC: 1.06 MG/DL (ref 0.55–1.02)
DIFFERENTIAL METHOD BLD: ABNORMAL
EOSINOPHIL # BLD: 0.2 K/UL (ref 0–0.4)
EOSINOPHIL NFR BLD: 3 % (ref 0–7)
ERYTHROCYTE [DISTWIDTH] IN BLOOD BY AUTOMATED COUNT: 13.2 % (ref 11.5–14.5)
GLOBULIN SER CALC-MCNC: 3.3 G/DL (ref 2–4)
GLUCOSE SERPL-MCNC: 95 MG/DL (ref 65–100)
HCT VFR BLD AUTO: 36.9 % (ref 35–47)
HDLC SERPL-MCNC: 54 MG/DL
HDLC SERPL: 4.1 {RATIO} (ref 0–5)
HGB BLD-MCNC: 11.8 G/DL (ref 11.5–16)
IMM GRANULOCYTES # BLD AUTO: 0 K/UL (ref 0–0.04)
IMM GRANULOCYTES NFR BLD AUTO: 1 % (ref 0–0.5)
LDLC SERPL CALC-MCNC: 100.8 MG/DL (ref 0–100)
LIPID PROFILE,FLP: ABNORMAL
LYMPHOCYTES # BLD: 1.2 K/UL (ref 0.8–3.5)
LYMPHOCYTES NFR BLD: 22 % (ref 12–49)
MCH RBC QN AUTO: 32 PG (ref 26–34)
MCHC RBC AUTO-ENTMCNC: 32 G/DL (ref 30–36.5)
MCV RBC AUTO: 100 FL (ref 80–99)
MONOCYTES # BLD: 0.6 K/UL (ref 0–1)
MONOCYTES NFR BLD: 10 % (ref 5–13)
NEUTS SEG # BLD: 3.5 K/UL (ref 1.8–8)
NEUTS SEG NFR BLD: 63 % (ref 32–75)
NRBC # BLD: 0 K/UL (ref 0–0.01)
NRBC BLD-RTO: 0 PER 100 WBC
PLATELET # BLD AUTO: 213 K/UL (ref 150–400)
PMV BLD AUTO: 10.1 FL (ref 8.9–12.9)
POTASSIUM SERPL-SCNC: 5.3 MMOL/L (ref 3.5–5.1)
PROT SERPL-MCNC: 7.1 G/DL (ref 6.4–8.2)
RBC # BLD AUTO: 3.69 M/UL (ref 3.8–5.2)
SODIUM SERPL-SCNC: 141 MMOL/L (ref 136–145)
TRIGL SERPL-MCNC: 321 MG/DL (ref ?–150)
VLDLC SERPL CALC-MCNC: 64.2 MG/DL
WBC # BLD AUTO: 5.5 K/UL (ref 3.6–11)

## 2020-12-14 PROCEDURE — 99397 PER PM REEVAL EST PAT 65+ YR: CPT | Performed by: FAMILY MEDICINE

## 2020-12-14 PROCEDURE — 90471 IMMUNIZATION ADMIN: CPT | Performed by: FAMILY MEDICINE

## 2020-12-14 PROCEDURE — 90750 HZV VACC RECOMBINANT IM: CPT

## 2020-12-14 NOTE — PROGRESS NOTES
1. Have you been to the ER, urgent care clinic since your last visit? Hospitalized since your last visit? No    2. Have you seen or consulted any other health care providers outside of the [address] [place] [address] since your last visit? Include any pap smears or colon screening. No     Chief Complaint   Patient presents with    Complete Physical     Fasting    Health Maintenance Due   Topic Date Due    Shingrix Vaccine Age 49> (1 of 2) 06/12/2005    Breast Cancer Screen Mammogram  06/12/2005    GLAUCOMA SCREENING Q2Y  06/12/2020    Bone Densitometry (Dexa) Screening  06/12/2020     Bone density scan and mammogram done last year. Eye exam overdue, needs to re-schedule. Shingrix: has not had it done yet. Patient presents for routine immunizations. Patient denies any symptoms , reactions or allergies that would exclude them from being immunized today. After obtaining written consent, and per verbal orders of Dr. Faiza Waldron, first dose of shingrix ordered, signed, and given on right deltoid. Risks and adverse reactions were discussed and the VIS was given to them. All questions were addressed. Patient was observed for 15 minutes post injection. There were no reactions observed at this time.  Advised patient to call with any concerns or signs and symptoms of adverse reaction.      Beulah Cabrales LPN

## 2020-12-14 NOTE — PROGRESS NOTES
[facility] [facility] Note      Subjective:     Chief Complaint   Patient presents with    Complete Physical     Scherrie Felt is a 72y.o. year old female who presents for virtual evaluation of the following:      PMH:   Hypertension with HLD  Chronic. Diagnosed > 5 years ago  Home monitorin/60 today  Treatment:   Key CAD CHF Meds             spironolactone (ALDACTONE) 25 mg tablet (Taking) Take 1 Tab by mouth daily. valsartan (DIOVAN) 320 mg tablet (Taking) TAKE 1 TABLET DAILY    simvastatin (ZOCOR) 20 mg tablet (Taking) Take 1 Tab by mouth nightly.       Previous Tx:  Not adhering to strict low salt diet  Complications: None  Co-morbidities: HLD  BP Readings from Last 3 Encounters:   20 144/80   19 132/78   19 158/78     Lab Results   Component Value Date/Time    Cholesterol, total 169 2020 08:14 AM    HDL Cholesterol 50 2020 08:14 AM    LDL, calculated 90 2020 08:14 AM    VLDL, calculated 29 2020 08:14 AM    Triglyceride 144 2020 08:14 AM       Vitamin D Deifciency  Tc: OTC Vit D3 200IU daily  Lab Results   Component Value Date/Time    VITAMIN D, 25-HYDROXY 49.0 2020 08:14 AM         Overweight:   Diet: Unrestricted  Actrivity: none  Last Weight Metrics:  Weight Loss Metrics 2020   Today's Wt 163 lb 9.6 oz 163 lb 12.8 oz 163 lb 164 lb 14.4 oz 165 lb 12.8 oz 161 lb 12.8 oz 160 lb 14.4 oz   BMI 27.86 kg/m2 27.9 kg/m2 27.76 kg/m2 27.23 kg/m2 27.59 kg/m2 26.92 kg/m2 26.78 kg/m2     3 most recent PHQ Screens 2020   Little interest or pleasure in doing things Not at all   Feeling down, depressed, irritable, or hopeless Not at all   Total Score PHQ 2 0       Health Maintenance:   Health Maintenance   Topic Date Due    Shingrix Vaccine Age 49> (1 of 2) 2005    Breast Cancer Screen Mammogram  2005    GLAUCOMA SCREENING Q2Y  2020    Bone Densitometry (Dexa) Screening  06/12/2020    Lipid Screen  01/14/2021    Colorectal Cancer Screening Combo  05/21/2024    DTaP/Tdap/Td series (3 - Td) 10/06/2025    Hepatitis C Screening  Completed    Flu Vaccine  Completed    Pneumococcal 65+ years  Completed   - Dexa done by Richa Barclay    HIV or other STD testing: Declined  Domestic Violence Screen:   Abuse Screening Questionnaire 12/14/2020   Do you ever feel afraid of your partner? N   Are you in a relationship with someone who physically or mentally threatens you? N   Is it safe for you to go home? Y     Depression Screen:   3 most recent PHQ Screens 12/14/2020   Little interest or pleasure in doing things Not at all   Feeling down, depressed, irritable, or hopeless Not at all   Total Score PHQ 2 0       Smoker? Former Quit > 15 years ago      Pap:  S/p hysterectomy for fibroids  Mammogram: Last 2019 with GYn  No LMP recorded. Patient has had a hysterectomy. reports previously being sexually active. Acute Concerns:  None      Social:   Employment- works as  for [employer] with alone. Has an adult son    Patient Care Team:  Luisana Waterman MD as PCP - General (Family Medicine)  Luisana Waterman MD as PCP - [facility] Empaneled Provider  Fran Espinoza MD (Obstetrics & Gynecology)  Karon Zelaya MD (Dermatology)  Joselyn Patterson NP (Family Medicine)   Dentist- Seen regularly  Optho- Seen regularly      Review of Systems   Pertinent positives and negative per HPI. All other systems  reviewed are negative for a Comprehensive ROS (10+).        Past Medical History:   Diagnosis Date    Cat allergies 7/10/2014    Essential hypertension 10/15/2015    Hypercholesterolemia 8/13/2013    Sleep apnea     \"mild\"    Vitamin D deficiency 12/4/2017        Social History     Socioeconomic History    Marital status:      Spouse name: Not on file    Number of children: Not on file    Years of education: Not on file    Highest education level: Not on file   Occupational History    Not on file   Social Needs    Financial resource strain: Not on file    Food insecurity     Worry: Not on file     Inability: Not on file    Transportation needs     Medical: Not on file     Non-medical: Not on file   Tobacco Use    Smoking status: Former Smoker     Packs/day: 2.00     Years: 10.00     Pack years: 20.00     Types: Cigarettes     Start date: 1972     Last attempt to quit: 1982     Years since quittin.9    Smokeless tobacco: Never Used   Substance and Sexual Activity    Alcohol use: Yes     Alcohol/week: 14.0 standard drinks     Types: 14 Standard drinks or equivalent per week     Comment: 1-2 a night    Drug use: No    Sexual activity: Not Currently   Lifestyle    Physical activity     Days per week: Not on file     Minutes per session: Not on file    Stress: Not on file   Relationships    Social connections     Talks on phone: Not on file     Gets together: Not on file     Attends Restoration service: Not on file     Active member of club or organization: Not on file     Attends meetings of clubs or organizations: Not on file     Relationship status: Not on file    Intimate partner violence     Fear of current or ex partner: Not on file     Emotionally abused: Not on file     Physically abused: Not on file     Forced sexual activity: Not on file   Other Topics Concern    Not on file   Social History Narrative    Not on file       Family History   Problem Relation Age of Onset    Elevated Lipids Mother     Alzheimer Mother     Elevated Lipids Father     Stroke Father 80    Hypertension Father     Breast Cancer Paternal Grandmother        Current Outpatient Medications   Medication Sig    spironolactone (ALDACTONE) 25 mg tablet Take 1 Tab by mouth daily.  valsartan (DIOVAN) 320 mg tablet TAKE 1 TABLET DAILY    simvastatin (ZOCOR) 20 mg tablet Take 1 Tab by mouth nightly.     VITAMIN B COMPLEX PO Take  by mouth.  cholecalciferol, vitamin D3, (VITAMIN D3) 2,000 unit tab Take  by mouth.  ONEXTON 1.2 %(1 % base) -3.75 % glwp Apply  to affected area daily.  sulfacetamide sodium-sulfur 10-5 % (w/w) clsr Apply  to affected area two (2) times a day.  TAZORAC 0.05 % topical cream Apply  to affected area daily.  FEXOFENADINE HCL (ALLEGRA ALLERGY PO) Take 180 mg by mouth daily.  triamcinolone (NASACORT AQ) 55 mcg nasal inhaler 2 Sprays daily.  multivitamin (ONE A DAY) tablet Take 1 Tab by mouth daily. No current facility-administered medications for this visit. Objective:     Visit Vitals  /67 (BP 1 Location: Left arm, BP Patient Position: Sitting)   Pulse 74   Temp 97.8 °F (36.6 °C) (Oral)   Resp 15   Ht 5' 4.25\" (1.632 m)   Wt 163 lb 9.6 oz (74.2 kg)   SpO2 98%   BMI 27.86 kg/m²         Physical Examination:  General: Alert, cooperative, no distress, appears stated age. Obese  Eyes: Conjunctivae clear. Pupils equally round and reactive to light, Extraocular muscles intact. Ears: Normal external ear canals both ears. Tympanic membranes clear and mobile bilaterally  Nose: Nares normal. Septum midline. Mucosa normal. No drainage or sinus tenderness. Mouth/Throat: Lips, mucosa, and tongue normal. No oropharyngeal erythema. No tonsillar enlargement or exudate. Neck: Supple, symmetrical, trachea midline, no adenopathy. No thyroid enlargement/tenderness/nodules  Respiratory: Breathing comfortably, in no acute respiratory distress. Clear to auscultation bilaterally. Normal inspiratory and expiratory ratio. Cardiovascular: Regular rate and rhythm, S1, S2 normal, no murmur, click, rub or gallop.   -Extremities no edema. Pulses 2+ and symmetric radial and dorsalis pedis   Abdomen: Soft, non-tender, not distended. Bowel sounds normal. No masses or organomegaly. MSK: Extremities normal appearing, atraumatic, no effusion. Gait steady and unassisted.  Back symmetric, no curvature. Range of motion normal. No Costovertebral angle tenderness. Skin: Skin color, texture, turgor normal. No rashes or lesions on exposed skin. - small varicose veins of lower extrmeiites  Lymph nodes: Cervical, supraclavicular nodes normal.  Neurologic: Cranial nerves II-XII intact. Strength 5/5 grossly. Sensation and reflexes normal throughout. Psychiatric: Affect appropriate. Mood euthymic. Thoughts logical. Speech volume and speed normal      No visits with results within 3 Month(s) from this visit. Latest known visit with results is:   Office Visit on 12/09/2019   Component Date Value Ref Range Status    Cholesterol, total 01/14/2020 169  100 - 199 mg/dL Final    Triglyceride 01/14/2020 144  0 - 149 mg/dL Final    HDL Cholesterol 01/14/2020 50  >39 mg/dL Final    VLDL, calculated 01/14/2020 29  5 - 40 mg/dL Final    LDL, calculated 01/14/2020 90  0 - 99 mg/dL Final    WBC 01/14/2020 5.6  3.4 - 10.8 x10E3/uL Final    RBC 01/14/2020 4.05  3.77 - 5.28 x10E6/uL Final    HGB 01/14/2020 12.7  11.1 - 15.9 g/dL Final    HCT 01/14/2020 37.6  34.0 - 46.6 % Final    MCV 01/14/2020 93  79 - 97 fL Final    MCH 01/14/2020 31.4  26.6 - 33.0 pg Final    MCHC 01/14/2020 33.8  31.5 - 35.7 g/dL Final    RDW 01/14/2020 12.8  11.7 - 15.4 % Final                  **Please note reference interval change**    PLATELET 18/44/0976 516  150 - 450 x10E3/uL Final    NEUTROPHILS 01/14/2020 62  Not Estab. % Final    Lymphocytes 01/14/2020 22  Not Estab. % Final    MONOCYTES 01/14/2020 10  Not Estab. % Final    EOSINOPHILS 01/14/2020 4  Not Estab. % Final    BASOPHILS 01/14/2020 1  Not Estab. % Final    ABS. NEUTROPHILS 01/14/2020 3.5  1.4 - 7.0 x10E3/uL Final    Abs Lymphocytes 01/14/2020 1.3  0.7 - 3.1 x10E3/uL Final    ABS. MONOCYTES 01/14/2020 0.6  0.1 - 0.9 x10E3/uL Final    ABS. EOSINOPHILS 01/14/2020 0.2  0.0 - 0.4 x10E3/uL Final    ABS.  BASOPHILS 01/14/2020 0.1  0.0 - 0.2 x10E3/uL Final    IMMATURE GRANULOCYTES 01/14/2020 1  Not Estab. % Final    ABS. IMM. GRANS. 01/14/2020 0.0  0.0 - 0.1 x10E3/uL Final    Glucose 01/14/2020 90  65 - 99 mg/dL Final    BUN 01/14/2020 14  8 - 27 mg/dL Final    Creatinine 01/14/2020 0.70  0.57 - 1.00 mg/dL Final    GFR est non-AA 01/14/2020 92  >59 mL/min/1.73 Final    GFR est AA 01/14/2020 106  >59 mL/min/1.73 Final    BUN/Creatinine ratio 01/14/2020 20  12 - 28 Final    Sodium 01/14/2020 142  134 - 144 mmol/L Final    Potassium 01/14/2020 4.6  3.5 - 5.2 mmol/L Final    Chloride 01/14/2020 104  96 - 106 mmol/L Final    CO2 01/14/2020 24  20 - 29 mmol/L Final    Calcium 01/14/2020 9.6  8.7 - 10.3 mg/dL Final    Protein, total 01/14/2020 6.8  6.0 - 8.5 g/dL Final    Albumin 01/14/2020 4.2  3.6 - 4.8 g/dL Final    Comment:     **Effective January 20, 2020 Albumin reference**        interval will be changing to: Age                Male          Female            0 -  7 days        3.6 - 4.9      3.6 - 4.9            8 - 30 days        3.4 - 4.7      3.4 - 4.7            1 -  6 month       3.7 - 4.8      3.7 - 4.8     7 months -  2 years       3.9 - 5.0      3.9 - 5.0            3 -  5 years       4.0 - 5.0      4.0 - 5.0            6 - 12 years       4.1 - 5.0      4.0 - 5.0           13 - 30 years       4.1 - 5.2      3.9 - 5.0           31 - 50 years       4.0 - 5.0      3.8 - 4.8           51 - 60 years       3.8 - 4.9      3.8 - 4.9           61 - 70 years       3.8 - 4.8      3.8 - 4.8           71 - 80 years       3.7 - 4.7      3.7 - 4.7           81 - 89 years       3.6 - 4.6      3.6 - 4.6               >89 years       3.5 - 4.6      3.5 - 4.6      GLOBULIN, TOTAL 01/14/2020 2.6  1.5 - 4.5 g/dL Final    A-G Ratio 01/14/2020 1.6  1.2 - 2.2 Final    Bilirubin, total 01/14/2020 0.4  0.0 - 1.2 mg/dL Final    Alk.  phosphatase 01/14/2020 64  39 - 117 IU/L Final    AST (SGOT) 01/14/2020 26  0 - 40 IU/L Final    ALT (SGPT) 01/14/2020 41* 0 - 32 IU/L Final    VITAMIN D, 25-HYDROXY 01/14/2020 49.0  30.0 - 100.0 ng/mL Final    Comment: Vitamin D deficiency has been defined by the [place] of  Medicine and an Endocrine Society practice guideline as a  level of serum 25-OH vitamin D less than 20 ng/mL (1,2). The Endocrine Society went on to further define vitamin D  insufficiency as a level between 21 and 29 ng/mL (2). 1. IOM ([place] of Medicine). 2010. Dietary reference     intakes for calcium and D. [facility]: The     [employer]. 2. Elroy MF, Colin NC, Natalie MONIQUE, et al.     Evaluation, treatment, and prevention of vitamin D     deficiency: an Endocrine Society clinical practice     guideline. JCEM. 2011 Jul; 96(7):1911-30.  TSH 01/14/2020 2.000  0.450 - 4.500 uIU/mL Final         Assessment/ Plan:   Diagnoses and all orders for this visit:    1. Well woman exam (no gynecological exam)  -     CBC WITH AUTOMATED DIFF; Future  -     METABOLIC PANEL, COMPREHENSIVE; Future  -     LIPID PANEL; Future    2. Encounter for immunization  -     ZOSTER VACC RECOMBINANT ADJUVANTED  -     [place] IMMUNIZ ADMIN,1 SINGLE/COMB VAC/TOXOID      For today's visit, I did the following:  · Reviewed PMH as listed in orders  · Reviewed labs in detail or ordered lab  · Requested or reviewed prior medical records  Labs to eval end organ function and etiology of chronic/acute concerns. Relevant vaccine, cancer screening and other health maintenance reviewed and updated per orders. Blood pressure is well controlled. Continue current regimen. DASH Diet recommended. Diet and lifestyle modification encouraged for weight loss and chronic disease prevention/ management. Negative depression screen. Follow up with specialists per routine. ADDENDUM: documentation corrected to remove virtual visit documentation as this visit was in office.      We discussed the expected course, resolution and complications of the diagnosis(es) in detail. Medication risks, benefits, costs, interactions, and alternatives were discussed as indicated. I advised her to contact the office if her condition worsens, changes or fails to improve as anticipated. She expressed understanding with the diagnosis(es) and plan. Educated patient on red flag symptoms to warrant return to clinic or emergency room visit. I have discussed the diagnosis with the patient and the intended plan as seen in the above orders. The patient has been offered or received an after-visit summary and questions were answered concerning future plans. I have discussed medication side effects and warnings with the patient as well. Follow-up and Dispositions    · Return in about 6 months (around 6/14/2021) for Follow Up blood pressure.          Signed,    Marcel Gonzales MD  12/14/2020

## 2020-12-14 NOTE — PATIENT INSTRUCTIONS
A Healthy Lifestyle: Care Instructions Your Care Instructions A healthy lifestyle can help you feel good, stay at a healthy weight, and have plenty of energy for both work and play. A healthy lifestyle is something you can share with your whole family. A healthy lifestyle also can lower your risk for serious health problems, such as high blood pressure, heart disease, and diabetes. You can follow a few steps listed below to improve your health and the health of your family. Follow-up care is a key part of your treatment and safety. Be sure to make and go to all appointments, and call your doctor if you are having problems. It's also a good idea to know your test results and keep a list of the medicines you take. How can you care for yourself at home? · Do not eat too much sugar, fat, or fast foods. You can still have dessert and treats now and then. The goal is moderation. · Start small to improve your eating habits. Pay attention to portion sizes, drink less juice and soda pop, and eat more fruits and vegetables. ? Eat a healthy amount of food. A 3-ounce serving of meat, for example, is about the size of a deck of cards. Fill the rest of your plate with vegetables and whole grains. ? Limit the amount of soda and sports drinks you have every day. Drink more water when you are thirsty. ? Eat at least 5 servings of fruits and vegetables every day. It may seem like a lot, but it is not hard to reach this goal. A serving or helping is 1 piece of fruit, 1 cup of vegetables, or 2 cups of leafy, raw vegetables. Have an apple or some carrot sticks as an afternoon snack instead of a candy bar.  Try to have fruits and/or vegetables at every meal. 
 · Make exercise part of your daily routine. You may want to start with simple activities, such as walking, bicycling, or slow swimming. Try to be active 30 to 60 minutes every day. You do not need to do all 30 to 60 minutes all at once. For example, you can exercise 3 times a day for 10 or 20 minutes. Moderate exercise is safe for most people, but it is always a good idea to talk to your doctor before starting an exercise program. 
· Keep moving. [employer] the lawn, work in the garden, or [employer]. Take the stairs instead of the elevator at work. · If you smoke, quit. People who smoke have an increased risk for heart attack, stroke, cancer, and other lung illnesses. Quitting is hard, but there are ways to boost your chance of quitting tobacco for good. ? Use nicotine gum, patches, or lozenges. ? Ask your doctor about stop-smoking programs and medicines. ? Keep trying. In addition to reducing your risk of diseases in the future, you will notice some benefits soon after you stop using tobacco. If you have shortness of breath or asthma symptoms, they will likely get better within a few weeks after you quit. · Limit how much alcohol you drink. Moderate amounts of alcohol (up to 2 drinks a day for men, 1 drink a day for women) are okay. But drinking too much can lead to liver problems, high blood pressure, and other health problems. Family health If you have a family, there are many things you can do together to improve your health. · Eat meals together as a family as often as possible. · Eat healthy foods. This includes fruits, vegetables, lean meats and dairy, and whole grains. · Include your family in your fitness plan. Most people think of activities such as jogging or tennis as the way to fitness, but there are many ways you and your family can be more active. Anything that makes you breathe hard and gets your heart pumping is exercise. Here are some tips: ? Walk to do errands or to take your child to school or the bus. 
? Go for a family bike ride after dinner instead of watching TV. Where can you learn more? Go to http://www.gray.com/ Enter N454 in the search box to learn more about \"A Healthy Lifestyle: Care Instructions. \" Current as of: January 31, 2020               Content Version: 12.6 © 2006-2020 [employer]. Care instructions adapted under license by [employer] (which disclaims liability or warranty for this information). If you have questions about a medical condition or this instruction, always ask your healthcare professional. [address] any warranty or liability for your use of this information. Vaccine Information Statement Recombinant Zoster (Shingles) Vaccine: What You Need to Know Many Vaccine Information Statements are available in [place] and other languages. See www.immunize.org/vis Hojas de información sobre vacunas están disponibles en español y en muchos otros idiomas. Visite www.immunize.org/vis 1. Why get vaccinated? Recombinant zoster (shingles) vaccine can prevent shingles. Shingles (also called herpes zoster, or just zoster) is a painful skin rash, usually with blisters. In addition to the rash, shingles can cause fever, headache, chills, or upset stomach. More rarely, shingles can lead to pneumonia, hearing problems, blindness, brain inflammation (encephalitis), or death. The most common complication of shingles is long-term nerve pain called postherpetic neuralgia (PHN). PHN occurs in the areas where the shingles rash was, even after the rash clears up. It can last for months or years after the rash goes away. The pain from PHN can be severe and debilitating. About 10 to 18% of people who get shingles will experience PHN. The risk of PHN increases with age. An older adult with shingles is more likely to develop PHN and have longer lasting and more severe pain than a younger person with shingles. Shingles is caused by the varicella zoster virus, the same virus that causes chickenpox. After you have chickenpox, the virus stays in your body and can cause shingles later in life. Shingles cannot be passed from one person to another, but the virus that causes shingles can spread and cause chickenpox in someone who had never had chickenpox or received chickenpox vaccine. 2. Recombinant shingles vaccine Recombinant shingles vaccine provides strong protection against shingles. By preventing shingles, recombinant shingles vaccine also protects against PHN. Recombinant shingles vaccine is the preferred vaccine for the prevention of shingles. However, a different vaccine, live shingles vaccine, may be used in some circumstances. The recombinant shingles vaccine is recommended for adults 50 years and older without serious immune problems. It is given as a two-dose series. This vaccine is also recommended for people who have already gotten another type of shingles vaccine, the live shingles vaccine. There is no live virus in this vaccine. Shingles vaccine may be given at the same time as other vaccines. 3. Talk with your health care provider Tell your vaccine provider if the person getting the vaccine: 
 Has had an allergic reaction after a previous dose of recombinant shingles vaccine, or has any severe, life-threatening allergies.  Is pregnant or breastfeeding.  Is currently experiencing an episode of shingles. In some cases, your health care provider may decide to postpone shingles vaccination to a future visit. People with minor illnesses, such as a cold, may be vaccinated. People who are moderately or severely ill should usually wait until they recover before getting recombinant shingles vaccine. Your health care provider can give you more information. 4. Risks of a vaccine reaction  A sore arm with mild or moderate pain is very common after recombinant shingles vaccine, affecting about 80% of vaccinated people. Redness and swelling can also happen at the site of the injection.  Tiredness, muscle pain, headache, shivering, fever, stomach pain, and nausea happen after vaccination in more than half of people who receive recombinant shingles vaccine. In clinical trials, about 1 out of 6 people who got recombinant zoster vaccine experienced side effects that prevented them from doing regular activities. Symptoms usually went away on their own in 2 to 3 days. You should still get the second dose of recombinant zoster vaccine even if you had one of these reactions after the first dose. People sometimes faint after medical procedures, including vaccination. Tell your provider if you feel dizzy or have vision changes or ringing in the ears. As with any medicine, there is a very remote chance of a vaccine causing a severe allergic reaction, other serious injury, or death. 5. What if there is a serious problem? An allergic reaction could occur after the vaccinated person leaves the clinic. If you see signs of a severe allergic reaction (hives, swelling of the face and throat, difficulty breathing, a fast heartbeat, dizziness, or weakness), call 9-1-1 and get the person to the nearest hospital. 
 
For other signs that concern you, call your health care provider. Adverse reactions should be reported to the Vaccine Adverse Event Reporting System (VAERS). Your health care provider will usually file this report, or you can do it yourself. Visit the VAERS website at www.vaers. [facility].gov or call 4-215.892.5882. VAERS is only for reporting reactions, and VAERS staff do not give medical advice. 6. How can I learn more?  Ask your health care provider.  Call your local or state health department.  Contact the Centers for Disease Control and Prevention (CDC): 
- Call 4-849.107.5699 (1-800-CDC-INFO) or 
- Visit CDCs website at www.cdc.gov/vaccines Vaccine Information Statement Recombinant Zoster Vaccine 10/30/2019 Department of Health and [employer] Centers for Disease Control and Prevention Office Use Only

## 2020-12-20 RX ORDER — ATORVASTATIN CALCIUM 10 MG/1
10 TABLET, FILM COATED ORAL DAILY
Qty: 90 TAB | Refills: 3 | Status: SHIPPED | OUTPATIENT
Start: 2020-12-20 | End: 2021-06-16 | Stop reason: ALTCHOICE

## 2020-12-20 RX ORDER — SPIRONOLACTONE 25 MG/1
12.5 TABLET ORAL DAILY
Qty: 90 TAB | Refills: 0 | Status: SHIPPED | OUTPATIENT
Start: 2020-12-20 | End: 2021-12-03 | Stop reason: SDUPTHER

## 2020-12-20 NOTE — PROGRESS NOTES
Notify Patient and schedule lab visit:  Your potassium level is slightly higher than normal. Decrease your spirolactone to 12.5 or HALF tablet daily. Your kidney function is slightly worse than previous . Return for lab visit to recheck this in 1-2 weeks. Your cholesterol is worsening. Change to atorvastatin.

## 2021-01-05 DIAGNOSIS — I10 ESSENTIAL HYPERTENSION: ICD-10-CM

## 2021-01-05 NOTE — TELEPHONE ENCOUNTER
MD Trav Rivers is sending request for a reshipment authorization. Patient has not yet received her medication in the mail and she is requesting another shipment. Must be approved by physician. Patient just seen so can send new rx if appropriate.   Thanks, [address]        Last Visit: 12/14/20 Harper Diaz  Next Appointment: 6/16/21  MD Harper Diaz  Previous Refill Encounter(s): 9/15/20 90  + 1     Requested Prescriptions     Pending Prescriptions Disp Refills    valsartan (DIOVAN) 320 mg tablet 90 Tab 1     Sig: Take 1 Tab by mouth daily. (and for replacement of lost shipment)

## 2021-01-06 RX ORDER — VALSARTAN 320 MG/1
320 TABLET ORAL DAILY
Qty: 90 TAB | Refills: 1 | Status: SHIPPED | OUTPATIENT
Start: 2021-01-06 | End: 2021-03-24 | Stop reason: SDUPTHER

## 2021-02-04 ENCOUNTER — LAB ONLY (OUTPATIENT)
Dept: FAMILY MEDICINE CLINIC | Age: 66
End: 2021-02-04

## 2021-02-04 DIAGNOSIS — R79.89 ELEVATED SERUM CREATININE: ICD-10-CM

## 2021-02-04 DIAGNOSIS — I10 ESSENTIAL HYPERTENSION: ICD-10-CM

## 2021-02-04 DIAGNOSIS — E87.5 SERUM POTASSIUM ELEVATED: ICD-10-CM

## 2021-02-05 LAB
ANION GAP SERPL CALC-SCNC: 6 MMOL/L (ref 5–15)
BUN SERPL-MCNC: 25 MG/DL (ref 6–20)
BUN/CREAT SERPL: 24 (ref 12–20)
CALCIUM SERPL-MCNC: 10 MG/DL (ref 8.5–10.1)
CHLORIDE SERPL-SCNC: 109 MMOL/L (ref 97–108)
CO2 SERPL-SCNC: 27 MMOL/L (ref 21–32)
CREAT SERPL-MCNC: 1.05 MG/DL (ref 0.55–1.02)
GLUCOSE SERPL-MCNC: 120 MG/DL (ref 65–100)
POTASSIUM SERPL-SCNC: 4.1 MMOL/L (ref 3.5–5.1)
SODIUM SERPL-SCNC: 142 MMOL/L (ref 136–145)

## 2021-03-24 DIAGNOSIS — I10 ESSENTIAL HYPERTENSION: ICD-10-CM

## 2021-03-24 NOTE — TELEPHONE ENCOUNTER
MD Sanjana Monsalve,    Patient call stating she is still having issues with [facility] mail order. Requesting change to please send new rx to [facility]. Thanks, Steven Avendaño    Last Visit: 12/14/20 MD Sanjana Monsalve  Next Appointment: 6/16/21 MD Sanjana Monsalve  Previous Refill Encounter(s): 1/6/21 90 + 1 ([facility])    Requested Prescriptions     Pending Prescriptions Disp Refills    valsartan (DIOVAN) 320 mg tablet 90 Tab 1     Sig: Take 1 Tab by mouth daily.

## 2021-03-25 RX ORDER — VALSARTAN 320 MG/1
320 TABLET ORAL DAILY
Qty: 90 TAB | Refills: 1 | Status: SHIPPED | OUTPATIENT
Start: 2021-03-25 | End: 2021-07-27 | Stop reason: SDUPTHER

## 2021-06-16 ENCOUNTER — OFFICE VISIT (OUTPATIENT)
Dept: FAMILY MEDICINE CLINIC | Age: 66
End: 2021-06-16
Payer: COMMERCIAL

## 2021-06-16 VITALS
BODY MASS INDEX: 28.68 KG/M2 | RESPIRATION RATE: 18 BRPM | OXYGEN SATURATION: 97 % | WEIGHT: 168 LBS | DIASTOLIC BLOOD PRESSURE: 64 MMHG | HEART RATE: 77 BPM | HEIGHT: 64 IN | TEMPERATURE: 97.1 F | SYSTOLIC BLOOD PRESSURE: 106 MMHG

## 2021-06-16 DIAGNOSIS — L70.9 ADULT ACNE: ICD-10-CM

## 2021-06-16 DIAGNOSIS — T46.6X5A ADVERSE EFFECT OF STATIN: ICD-10-CM

## 2021-06-16 DIAGNOSIS — R53.83 FATIGUE, UNSPECIFIED TYPE: ICD-10-CM

## 2021-06-16 DIAGNOSIS — E78.00 HYPERCHOLESTEROLEMIA: ICD-10-CM

## 2021-06-16 DIAGNOSIS — Z23 ENCOUNTER FOR IMMUNIZATION: ICD-10-CM

## 2021-06-16 DIAGNOSIS — I10 ESSENTIAL HYPERTENSION: Primary | ICD-10-CM

## 2021-06-16 LAB
ALBUMIN SERPL-MCNC: 3.7 G/DL (ref 3.5–5)
ALBUMIN/GLOB SERPL: 1.2 {RATIO} (ref 1.1–2.2)
ALP SERPL-CCNC: 85 U/L (ref 45–117)
ALT SERPL-CCNC: 32 U/L (ref 12–78)
ANION GAP SERPL CALC-SCNC: 4 MMOL/L (ref 5–15)
AST SERPL-CCNC: 18 U/L (ref 15–37)
BILIRUB SERPL-MCNC: 0.3 MG/DL (ref 0.2–1)
BUN SERPL-MCNC: 21 MG/DL (ref 6–20)
BUN/CREAT SERPL: 26 (ref 12–20)
CALCIUM SERPL-MCNC: 9.1 MG/DL (ref 8.5–10.1)
CHLORIDE SERPL-SCNC: 107 MMOL/L (ref 97–108)
CHOLEST SERPL-MCNC: 201 MG/DL
CK SERPL-CCNC: 57 U/L (ref 26–192)
CO2 SERPL-SCNC: 27 MMOL/L (ref 21–32)
COMMENT, HOLDF: NORMAL
CREAT SERPL-MCNC: 0.82 MG/DL (ref 0.55–1.02)
GLOBULIN SER CALC-MCNC: 3.2 G/DL (ref 2–4)
GLUCOSE SERPL-MCNC: 99 MG/DL (ref 65–100)
HDLC SERPL-MCNC: 62 MG/DL
HDLC SERPL: 3.2 {RATIO} (ref 0–5)
LDLC SERPL CALC-MCNC: 107.8 MG/DL (ref 0–100)
POTASSIUM SERPL-SCNC: 4.8 MMOL/L (ref 3.5–5.1)
PROT SERPL-MCNC: 6.9 G/DL (ref 6.4–8.2)
SAMPLES BEING HELD,HOLD: NORMAL
SODIUM SERPL-SCNC: 138 MMOL/L (ref 136–145)
TRIGL SERPL-MCNC: 156 MG/DL (ref ?–150)
TSH SERPL DL<=0.05 MIU/L-ACNC: 1.23 UIU/ML (ref 0.36–3.74)
VLDLC SERPL CALC-MCNC: 31.2 MG/DL

## 2021-06-16 PROCEDURE — 90750 HZV VACC RECOMBINANT IM: CPT | Performed by: FAMILY MEDICINE

## 2021-06-16 PROCEDURE — 99214 OFFICE O/P EST MOD 30 MIN: CPT | Performed by: FAMILY MEDICINE

## 2021-06-16 RX ORDER — SIMVASTATIN 40 MG/1
40 TABLET, FILM COATED ORAL
Qty: 90 TABLET | Refills: 1 | Status: SHIPPED | OUTPATIENT
Start: 2021-06-16 | End: 2021-12-03 | Stop reason: SDUPTHER

## 2021-06-16 RX ORDER — CLINDAMYCIN PHOSPHATE AND BENZOYL PEROXIDE 10; 37.5 MG/G; MG/G
GEL TOPICAL
COMMUNITY

## 2021-06-16 RX ORDER — TAZAROTENE 1 MG/G
CREAM TOPICAL
COMMUNITY
Start: 2021-03-10

## 2021-06-16 NOTE — PROGRESS NOTES
[facility] Note    Assessment/ Plan:   Diagnoses and all orders for this visit:    1. Essential hypertension  -     METABOLIC PANEL, COMPREHENSIVE; Future    2. Hypercholesterolemia  -     simvastatin (ZOCOR) 40 mg tablet; Take 1 Tablet by mouth nightly. -     LIPID PANEL; Future    3. Adult acne    4. Fatigue, unspecified type  -     CK; Future  -     TSH 3RD GENERATION; Future  -     simvastatin (ZOCOR) 40 mg tablet; Take 1 Tablet by mouth nightly. 5. Adverse effect of statin  -     CK; Future    6. Encounter for immunization  -     ZOSTER VACC RECOMBINANT ADJUVANTED    Other orders  -     SAMPLES BEING HELD      Recommendations based on history, physical exam and review of pertinent labs, studies and medications:   Blood pressure is well controlled. Continue current regimen. DASH Diet recommended. Chronic fatigue, labs evaluate. Hyperlipidemia, chronic. Change back to simvastatin increased dose. May stop atorvastatin due to concern for side effects. Follow up with specialists per routine. Educated patient on red flag symptoms to warrant return to clinic or emergency room visit. I have discussed the diagnosis with the patient and the intended plan as seen in the above orders. The patient has been offered or received an after-visit summary and questions were answered concerning future plans. I have discussed medication side effects and warnings with the patient as well. Follow-up and Dispositions    · Return in about 6 months (around 12/16/2021) for Follow Up, Physical exam.       Subjective:     Chief Complaint   Patient presents with    Hypertension     6 month follow up    Cholesterol Problem     Ruddy Vaughn is a 77y.o. year old female who presents for evaluation of the following:      Hypertension with HLD  Chronic.  Diagnosed > 5 years ago  Home monitoring: No log in office  Patient prefers simvastatin  Key CAD CHF Meds             valsartan (DIOVAN) 320 mg tablet (Taking) Take 1 Tab by mouth daily. atorvastatin (LIPITOR) 10 mg tablet (Taking) Take 1 Tab by mouth daily. spironolactone (ALDACTONE) 25 mg tablet (Taking) Take 0.5 Tabs by mouth daily. BP Readings from Last 3 Encounters:   06/16/21 106/64   12/14/20 128/67   01/20/20 144/80     The 10-year ASCVD risk score (Phil Etienne, et al., 2013) is: 6%      Vitamin D Deifciency  Tc: OTC Vit D3 200IU daily  Lab Results   Component Value Date/Time    VITAMIN D, 25-HYDROXY 49.0 01/14/2020 08:14 AM         Overweight:   Diet: Unrestricted  Actrivity: none  Last Weight Metrics:  Weight Loss Metrics 6/16/2021 12/14/2020 1/20/2020 12/9/2019 6/5/2019 12/6/2018 7/17/2018   Today's Wt 168 lb 163 lb 9.6 oz 163 lb 12.8 oz 163 lb 164 lb 14.4 oz 165 lb 12.8 oz 161 lb 12.8 oz   BMI 28.61 kg/m2 27.86 kg/m2 27.9 kg/m2 27.76 kg/m2 27.23 kg/m2 27.59 kg/m2 26.92 kg/m2       Fatigue/ History of KATJA on CPAP  Onset 1/2021  Snoring loudly, recently told by granddaughter  Hs history of mild KATJA on CPAP in the past improved with weight loss   Busy at work and retiring soon and plan to start working out ad training for trip to [facility] [place]      Patient Care Team:  Rom Harmon MD as PCP - General (Family Medicine)  Rom Harmon MD as PCP - [facility] Empaneled Provider  Matthias Guadalupe MD (Obstetrics & Gynecology)  Bridget Brooks MD (Dermatology)  Humera Cleaning NP (Family Medicine)    Review of Systems   Pertinent positives and negative per HPI. All other systems  reviewed are negative for a Comprehensive ROS (10+). Past medical history, social history, family history reviewed. Medications reconciled. Objective:     Vitals:    06/16/21 0813   BP: 106/64   Pulse: 77   Resp: 18   Temp: 97.1 °F (36.2 °C)   TempSrc: Temporal   SpO2: 97%   Weight: 168 lb (76.2 kg)   Height: 5' 4.25\" (1.632 m)       Physical Examination:  General: Alert, cooperative, no distress, appears stated age.    Eyes: Conjunctivae clear. Pupils equally round and reactive to light, Extraocular muscles intact. Ears: Normal external ear canals both ears. Nose: Nares normal. Septum midline. Mucosa normal. No drainage or sinus tenderness. Mouth/Throat: Lips, mucosa, and tongue normal. No oropharyngeal erythema. No tonsillar enlargement or exudate. Neck: Supple, symmetrical, trachea midline, no adenopathy. No thyroid enlargement/tenderness/nodules  Respiratory: Breathing comfortably, in no acute respiratory distress. Clear to auscultation bilaterally. Normal inspiratory and expiratory ratio. Cardiovascular: Regular rate and rhythm, S1, S2 normal, no murmur, click, rub or gallop.   -Extremities no edema. Pulses 2+ and symmetric radial and dorsalis pedis   Abdomen: Soft, non-tender, not distended. Bowel sounds normal. No masses or organomegaly. MSK: Extremities normal appearing, atraumatic, no effusion. Gait steady and unassisted. Skin: Skin color, texture, turgor normal. No rashes or lesions on exposed skin. Lymph nodes: Cervical, supraclavicular nodes normal.  Neurologic: Cranial nerves II-XII intact. Strength 5/5 grossly. Sensation and reflexes normal throughout. Psychiatric: Affect appropriate. Mood euthymic.  Thoughts logical. Speech volume and speed normal      Signed,    Jessica Dominguez MD  6/16/2021

## 2021-06-16 NOTE — PROGRESS NOTES
Chief Complaint   Patient presents with    Hypertension     6 month follow up    Cholesterol Problem     1. Have you been to the ER, urgent care clinic since your last visit? Hospitalized since your last visit? No    2. Have you seen or consulted any other health care providers outside of the [address] [place] [address] since your last visit? Include any pap smears or colon screening.  No

## 2021-06-24 NOTE — PATIENT INSTRUCTIONS
DASH Diet: Care Instructions  Your Care Instructions     The DASH diet is an eating plan that can help lower your blood pressure. DASH stands for Dietary Approaches to Stop Hypertension. Hypertension is high blood pressure. The DASH diet focuses on eating foods that are high in calcium, potassium, and magnesium. These nutrients can lower blood pressure. The foods that are highest in these nutrients are fruits, vegetables, low-fat dairy products, nuts, seeds, and legumes. But taking calcium, potassium, and magnesium supplements instead of eating foods that are high in those nutrients does not have the same effect. The DASH diet also includes whole grains, fish, and poultry. The DASH diet is one of several lifestyle changes your doctor may recommend to lower your high blood pressure. Your doctor may also want you to decrease the amount of sodium in your diet. Lowering sodium while following the DASH diet can lower blood pressure even further than just the DASH diet alone. Follow-up care is a key part of your treatment and safety. Be sure to make and go to all appointments, and call your doctor if you are having problems. It's also a good idea to know your test results and keep a list of the medicines you take. How can you care for yourself at home? Following the DASH diet  · Eat 4 to 5 servings of fruit each day. A serving is 1 medium-sized piece of fruit, ½ cup chopped or canned fruit, 1/4 cup dried fruit, or 4 ounces (½ cup) of fruit juice. Choose fruit more often than fruit juice. · Eat 4 to 5 servings of vegetables each day. A serving is 1 cup of lettuce or raw leafy vegetables, ½ cup of chopped or cooked vegetables, or 4 ounces (½ cup) of vegetable juice. Choose vegetables more often than vegetable juice. · Get 2 to 3 servings of low-fat and fat-free dairy each day. A serving is 8 ounces of milk, 1 cup of yogurt, or 1 ½ ounces of cheese. · Eat 6 to 8 servings of grains each day.  A serving is 1 slice of bread, 1 ounce of dry cereal, or ½ cup of cooked rice, pasta, or cooked cereal. Try to choose whole-grain products as much as possible. · Limit lean meat, poultry, and fish to 2 servings each day. A serving is 3 ounces, about the size of a deck of cards. · Eat 4 to 5 servings of nuts, seeds, and legumes (cooked dried beans, lentils, and split peas) each week. A serving is 1/3 cup of nuts, 2 tablespoons of seeds, or ½ cup of cooked beans or peas. · Limit fats and oils to 2 to 3 servings each day. A serving is 1 teaspoon of vegetable oil or 2 tablespoons of salad dressing. · Limit sweets and added sugars to 5 servings or less a week. A serving is 1 tablespoon jelly or jam, ½ cup sorbet, or 1 cup of lemonade. · Eat less than 2,300 milligrams (mg) of sodium a day. If you limit your sodium to 1,500 mg a day, you can lower your blood pressure even more. · Be aware that all of these are the suggested number of servings for people who eat 1,800 to 2,000 calories a day. Your recommended number of servings may be different if you need more or fewer calories. Tips for success  · Start small. Do not try to make dramatic changes to your diet all at once. You might feel that you are missing out on your favorite foods and then be more likely to not follow the plan. Make small changes, and stick with them. Once those changes become habit, add a few more changes. · Try some of the following:  ? Make it a goal to eat a fruit or vegetable at every meal and at snacks. This will make it easy to get the recommended amount of fruits and vegetables each day. ? Try yogurt topped with fruit and nuts for a snack or healthy dessert. ? Add lettuce, tomato, cucumber, and onion to sandwiches. ? Combine a ready-made pizza crust with low-fat mozzarella cheese and lots of vegetable toppings. Try using tomatoes, squash, spinach, broccoli, carrots, cauliflower, and onions. ?  Have a variety of cut-up vegetables with a low-fat dip as an appetizer instead of chips and dip. ? Sprinkle sunflower seeds or chopped almonds over salads. Or try adding chopped walnuts or almonds to cooked vegetables. ? Try some vegetarian meals using beans and peas. Add garbanzo or kidney beans to salads. Make burritos and tacos with mashed ruiz beans or black beans. Where can you learn more? Go to http://www.villarreal.com/  Enter H967 in the search box to learn more about \"DASH Diet: Care Instructions. \"  Current as of: August 31, 2020               Content Version: 12.8  © 1439-5385 [employer]. Care instructions adapted under license by [employer] (which disclaims liability or warranty for this information). If you have questions about a medical condition or this instruction, always ask your healthcare professional. [address] any warranty or liability for your use of this information.

## 2021-06-24 NOTE — PROGRESS NOTES
Most of your test results are normal.  Your cholesterol was higher than normal. If your calculated risk for heart attack or stroke in 10 years  is higher than 7.5%, then cholesterol medication is recommended. The 10-year ASCVD risk score (Jojo Reyna, et al., 2013) is: 5.5%. Continue your current medications. Try to avoid foods high in saturated fats such as processed meats, fried foods, and greasy snacks. Weight loss will also help with this.  Mychart result comment sent

## 2021-07-27 DIAGNOSIS — I10 ESSENTIAL HYPERTENSION: ICD-10-CM

## 2021-07-27 RX ORDER — VALSARTAN 320 MG/1
320 TABLET ORAL DAILY
Qty: 90 TABLET | Refills: 1 | Status: SHIPPED | OUTPATIENT
Start: 2021-07-27 | End: 2021-12-03 | Stop reason: SDUPTHER

## 2021-10-27 ENCOUNTER — TELEPHONE (OUTPATIENT)
Dept: INTERNAL MEDICINE CLINIC | Age: 66
End: 2021-10-27

## 2021-10-27 NOTE — TELEPHONE ENCOUNTER
----- Message from Mary Berry sent at 10/27/2021 11:37 AM EDT -----  Subject: Appointment Request    Reason for Call: New Patient Request Appointment    QUESTIONS  Type of Appointment? New Patient/New to Provider  Reason for appointment request? Requested Provider pierre - Joanna Santacruz  Additional Information for Provider? Please return call to assist patient   with scheduling a NP appointment with this provider. ---------------------------------------------------------------------------  --------------  Isma Muse INFO  What is the best way for the office to contact you? OK to leave message on   voicemail  Preferred Call Back Phone Number? [phone]  ---------------------------------------------------------------------------  --------------  SCRIPT ANSWERS  Relationship to Patient? Self  Specialty Confirmation? Primary Care  Is this the first appointment to establish care for a ? No  Have you been diagnosed with, awaiting test results for, or told that you   are suspected of having COVID-19 (Coronavirus)? (If patient has tested   negative or was tested as a requirement for work, school, or travel and   not based on symptoms, answer no)? No  Within the past two weeks have you developed any of the following symptoms   (answer no if symptoms have been present longer than 2 weeks or began   more than 2 weeks ago)? Fever or Chills, Cough, Shortness of breath or   difficulty breathing, Loss of taste or smell, Sore throat, Nasal   congestion, Sneezing or runny nose, Fatigue or generalized body aches   (answer no if pain is specific to a body part e.g. back pain), Diarrhea,   Headache? No  Have you had close contact with someone with COVID-19 in the last 14 days? No  (Service Expert - click yes below to proceed with [employer] As Usual   Scheduling)?  Yes

## 2021-12-03 ENCOUNTER — OFFICE VISIT (OUTPATIENT)
Dept: INTERNAL MEDICINE CLINIC | Age: 66
End: 2021-12-03
Payer: MEDICARE

## 2021-12-03 VITALS
RESPIRATION RATE: 16 BRPM | OXYGEN SATURATION: 97 % | HEART RATE: 94 BPM | DIASTOLIC BLOOD PRESSURE: 72 MMHG | BODY MASS INDEX: 28.59 KG/M2 | HEIGHT: 65 IN | WEIGHT: 171.6 LBS | SYSTOLIC BLOOD PRESSURE: 123 MMHG | TEMPERATURE: 98.2 F

## 2021-12-03 DIAGNOSIS — E78.00 HYPERCHOLESTEROLEMIA: ICD-10-CM

## 2021-12-03 DIAGNOSIS — L70.9 ADULT ACNE: ICD-10-CM

## 2021-12-03 DIAGNOSIS — E55.9 VITAMIN D DEFICIENCY: ICD-10-CM

## 2021-12-03 DIAGNOSIS — G47.33 OSA (OBSTRUCTIVE SLEEP APNEA): ICD-10-CM

## 2021-12-03 DIAGNOSIS — I10 ESSENTIAL HYPERTENSION: ICD-10-CM

## 2021-12-03 DIAGNOSIS — Z78.0 ASYMPTOMATIC MENOPAUSAL STATE: ICD-10-CM

## 2021-12-03 DIAGNOSIS — R53.83 FATIGUE, UNSPECIFIED TYPE: ICD-10-CM

## 2021-12-03 DIAGNOSIS — Z12.31 ENCOUNTER FOR SCREENING MAMMOGRAM FOR MALIGNANT NEOPLASM OF BREAST: ICD-10-CM

## 2021-12-03 DIAGNOSIS — Z00.00 ENCOUNTER FOR MEDICARE ANNUAL WELLNESS EXAM: Primary | ICD-10-CM

## 2021-12-03 PROCEDURE — 3288F FALL RISK ASSESSMENT DOCD: CPT | Performed by: PHYSICIAN ASSISTANT

## 2021-12-03 PROCEDURE — G9899 SCRN MAM PERF RSLTS DOC: HCPCS | Performed by: PHYSICIAN ASSISTANT

## 2021-12-03 PROCEDURE — 1090F PRES/ABSN URINE INCON ASSESS: CPT | Performed by: PHYSICIAN ASSISTANT

## 2021-12-03 PROCEDURE — G8419 CALC BMI OUT NRM PARAM NOF/U: HCPCS | Performed by: PHYSICIAN ASSISTANT

## 2021-12-03 PROCEDURE — G8432 DEP SCR NOT DOC, RNG: HCPCS | Performed by: PHYSICIAN ASSISTANT

## 2021-12-03 PROCEDURE — 3017F COLORECTAL CA SCREEN DOC REV: CPT | Performed by: PHYSICIAN ASSISTANT

## 2021-12-03 PROCEDURE — G8754 DIAS BP LESS 90: HCPCS | Performed by: PHYSICIAN ASSISTANT

## 2021-12-03 PROCEDURE — G8427 DOCREV CUR MEDS BY ELIG CLIN: HCPCS | Performed by: PHYSICIAN ASSISTANT

## 2021-12-03 PROCEDURE — 1100F PTFALLS ASSESS-DOCD GE2>/YR: CPT | Performed by: PHYSICIAN ASSISTANT

## 2021-12-03 PROCEDURE — 99203 OFFICE O/P NEW LOW 30 MIN: CPT | Performed by: PHYSICIAN ASSISTANT

## 2021-12-03 PROCEDURE — G8536 NO DOC ELDER MAL SCRN: HCPCS | Performed by: PHYSICIAN ASSISTANT

## 2021-12-03 PROCEDURE — G8400 PT W/DXA NO RESULTS DOC: HCPCS | Performed by: PHYSICIAN ASSISTANT

## 2021-12-03 PROCEDURE — G8752 SYS BP LESS 140: HCPCS | Performed by: PHYSICIAN ASSISTANT

## 2021-12-03 PROCEDURE — G0439 PPPS, SUBSEQ VISIT: HCPCS | Performed by: PHYSICIAN ASSISTANT

## 2021-12-03 RX ORDER — VALSARTAN 320 MG/1
320 TABLET ORAL DAILY
Qty: 90 TABLET | Refills: 1 | Status: SHIPPED | OUTPATIENT
Start: 2021-12-03 | End: 2022-01-27 | Stop reason: SDUPTHER

## 2021-12-03 RX ORDER — SIMVASTATIN 40 MG/1
40 TABLET, FILM COATED ORAL
Qty: 90 TABLET | Refills: 1 | Status: SHIPPED | OUTPATIENT
Start: 2021-12-03 | End: 2022-06-07 | Stop reason: SDUPTHER

## 2021-12-03 RX ORDER — SPIRONOLACTONE 25 MG/1
12.5 TABLET ORAL DAILY
Qty: 90 TABLET | Refills: 1 | Status: SHIPPED | OUTPATIENT
Start: 2021-12-03

## 2021-12-03 NOTE — PROGRESS NOTES
This is the Subsequent Medicare Annual Wellness Exam, performed 12 months or more after the Initial AWV or the last Subsequent AWV  Health Maintenance Due   Topic Date Due    Breast Cancer Screen Mammogram  Never done    Bone Densitometry (Dexa) Screening  Never done    COVID-19 Vaccine (2 - Booster for [employer] series) 05/03/2021    Medicare Yearly Exam  Never done     Depression Risk Factor Screening:     3 most recent PHQ Screens 6/16/2021   Little interest or pleasure in doing things Not at all   Feeling down, depressed, irritable, or hopeless Not at all   Total Score PHQ 2 0       Abuse Screen  Patient is not abused    Fall Risk  Fall Risk Assessment, last 12 mths 12/3/2021   Able to walk? Yes   Fall in past 12 months? 0   Do you feel unsteady? 0   Are you worried about falling 0   Number of falls in past 12 months -   Fall with injury? -       Alcohol Risk Factor Screening:    reports current alcohol use of about 7.0 standard drinks of alcohol per week. Functional Ability and Level of Safety:   Hearing Loss  Hearing is good. Activities of Daily Living  The home contains: handrails  Patient does total self care     Cognitive Screening   Evaluation of Cognitive Function:  Has your family/caregiver stated any concerns about your memory: no     Patient Care Team   Patient Care Team:  Angeli Boyd MD as PCP - General (Family Medicine)  Bala Rubalcava MD (Obstetrics & Gynecology)  Eduardo Saab MD (Dermatology)  Joy Fairbanks NP (Family Medicine)    Assessment/Plan   Education and counseling provided:  Are appropriate based on today's review and evaluation    Extended Emergency Contact Information  Primary Emergency Contact: Azeb Bronson6 Phone: [phone]  Relation: Child    ACP has one at home - will send it to me. I have reviewed the patient's medical history in detail and updated the computerized patient record.      History     Past Medical History:   Diagnosis Date    Cat allergies 7/10/2014    Essential hypertension 10/15/2015    Hypercholesterolemia 8/13/2013    Sleep apnea     \"mild\"    Vitamin D deficiency 12/4/2017      Past Surgical History:   Procedure Laterality Date    HX CATARACT REMOVAL Bilateral 05/2018    Dr. Francia Greene    Ovaries intact     Current Outpatient Medications   Medication Sig Dispense Refill    valsartan (DIOVAN) 320 mg tablet Take 1 Tablet by mouth daily. 90 Tablet 1    simvastatin (ZOCOR) 40 mg tablet Take 1 Tablet by mouth nightly. 90 Tablet 1    spironolactone (ALDACTONE) 25 mg tablet Take 0.5 Tablets by mouth daily. 90 Tablet 1    clindamycin-benzoyl peroxide (Onexton) 1.2 %(1 % base) -3.75 % gel Onexton 1.2 % (1 % base)-3.75 % topical gel   APPLY A PEA-SIZED AMOUNT BY TOPICAL ROUTE ONCE DAILY TO COVER AREAS OF FACE WITH THIN LAYER      tazorotene (AVAGE) 0.1 % topical cream       VITAMIN B COMPLEX PO Take  by mouth.  cholecalciferol, vitamin D3, (VITAMIN D3) 2,000 unit tab Take  by mouth.  sulfacetamide sodium-sulfur 10-5 % (w/w) clsr Apply  to affected area two (2) times a day.  TAZORAC 0.05 % topical cream Apply  to affected area daily.  FEXOFENADINE HCL (ALLEGRA ALLERGY PO) Take 180 mg by mouth daily.  triamcinolone (NASACORT AQ) 55 mcg nasal inhaler 2 Sprays daily.  multivitamin (ONE A DAY) tablet Take 1 Tab by mouth daily.  ONEXTON 1.2 %(1 % base) -3.75 % glwp Apply  to affected area daily.  (Patient not taking: Reported on 6/16/2021)       Allergies   Allergen Reactions    Adhesive Tape-Silicones Contact Dermatitis     Bandaids     Family History   Problem Relation Age of Onset    Elevated Lipids Mother     Alzheimer's Disease Mother     Elevated Lipids Father     Stroke Father 80    Hypertension Father     Breast Cancer Paternal Grandmother      Social History     Tobacco Use    Smoking status: Former Smoker     Packs/day: 1.00     Years: 10.00     Pack years: 10.00 Types: Cigarettes     Start date: 1972     Quit date: 1982     Years since quittin.9    Smokeless tobacco: Never Used   Substance Use Topics    Alcohol use:  Yes     Alcohol/week: 7.0 standard drinks     Types: 7 Standard drinks or equivalent per week     Patient Active Problem List   Diagnosis Code    Hypercholesterolemia E78.00    Cat allergies J30.81    Perennial allergic rhinitis J30.89    Essential hypertension I10    Adult acne L70.9    Vitamin D deficiency E55.9

## 2021-12-03 NOTE — PROGRESS NOTES
Lindi Baumgarten is a 77 y.o. female  Chief Complaint   Patient presents with   Talita Love Establish Care     Visit Vitals  /72   Pulse 94   Temp 98.2 °F (36.8 °C) (Temporal)   Resp 16   Ht 5' 5\" (1.651 m)   Wt 171 lb 9.6 oz (77.8 kg)   SpO2 97%   BMI 28.56 kg/m²      Health Maintenance Due   Topic Date Due    Breast Cancer Screen Mammogram  Never done    Bone Densitometry (Dexa) Screening  Never done    COVID-19 Vaccine (2 - Booster for [employer] series) 05/03/2021    Flu Vaccine (1) 09/01/2021    Medicare Yearly Exam  Never done       HPI  [facility] Feeling tired every day. Snoring regularly. Hx Sleep apnea. Untreated. Retired 3 months ago (). Happy with this change and has good social support. Has gained weight recently. Planning to change diet/exercise to lose weight. Wt Readings from Last 9 Encounters:   12/03/21 171 lb 9.6 oz (77.8 kg)   06/16/21 168 lb (76.2 kg)   12/14/20 163 lb 9.6 oz (74.2 kg)   01/20/20 163 lb 12.8 oz (74.3 kg)   12/09/19 163 lb (73.9 kg)   06/05/19 164 lb 14.4 oz (74.8 kg)   12/06/18 165 lb 12.8 oz (75.2 kg)   07/17/18 161 lb 12.8 oz (73.4 kg)   06/07/18 160 lb 14.4 oz (73 kg)     Started walking for exercise before going to [employer] [place] with grandchildren. Has done well with this exercise. Denies depression. HTN Follow up - BP controlled:  BP Readings from Last 3 Encounters:   12/03/21 123/72   06/16/21 106/64   12/14/20 128/67     Currently taking:   Key CAD CHF Meds             valsartan (DIOVAN) 320 mg tablet (Taking) Take 1 Tablet by mouth daily. simvastatin (ZOCOR) 40 mg tablet (Taking) Take 1 Tablet by mouth nightly. spironolactone (ALDACTONE) 25 mg tablet (Taking) Take 0.5 Tabs by mouth daily. ROS  Review of Systems   Constitutional: Positive for malaise/fatigue. Negative for fever and weight loss. Respiratory: Negative for cough and shortness of breath. Cardiovascular: Negative for chest pain and palpitations. Neurological: Negative for dizziness, loss of consciousness and weakness. Psychiatric/Behavioral: Negative for depression and substance abuse. The patient is not nervous/anxious and does not have insomnia. EXAM  Physical Exam  Vitals and nursing note reviewed. Constitutional:       General: She is not in acute distress. Appearance: She is well-developed. HENT:      Head: Normocephalic and atraumatic. Neck:      Vascular: No JVD. Cardiovascular:      Rate and Rhythm: Normal rate and regular rhythm. Heart sounds: Normal heart sounds. Pulmonary:      Effort: Pulmonary effort is normal. No respiratory distress. Breath sounds: Normal breath sounds. Musculoskeletal:      Cervical back: Neck supple. Skin:     General: Skin is warm and dry. Neurological:      Mental Status: She is alert and oriented to person, place, and time. Psychiatric:         Mood and Affect: Mood normal.         Behavior: Behavior normal.         Thought Content: Thought content normal.         Judgment: Judgment normal.       ASSESSMENT/PLAN  Encounter Diagnoses     ICD-10-CM ICD-9-CM   1. Essential hypertension  I10 401.9   2. Vitamin D deficiency  E55.9 268.9   3. Hypercholesterolemia  E78.00 272.0   4. Fatigue, unspecified type  R53.83 780.79   5.  KATJA (obstructive sleep apnea)  G47.33 327.23     Orders Placed This Encounter    LIPID PANEL    VITAMIN D, 25 HYDROXY    METABOLIC PANEL, COMPREHENSIVE    CBC W/O DIFF    TSH 3RD GENERATION    REFERRAL TO PULMONARY DISEASE

## 2021-12-09 LAB
25(OH)D3+25(OH)D2 SERPL-MCNC: 54.1 NG/ML (ref 30–100)
ALBUMIN SERPL-MCNC: 4.4 G/DL (ref 3.8–4.8)
ALBUMIN/GLOB SERPL: 1.8 {RATIO} (ref 1.2–2.2)
ALP SERPL-CCNC: 78 IU/L (ref 44–121)
ALT SERPL-CCNC: 24 IU/L (ref 0–32)
AST SERPL-CCNC: 20 IU/L (ref 0–40)
BILIRUB SERPL-MCNC: 0.4 MG/DL (ref 0–1.2)
BUN SERPL-MCNC: 15 MG/DL (ref 8–27)
BUN/CREAT SERPL: 15 (ref 12–28)
CALCIUM SERPL-MCNC: 9.7 MG/DL (ref 8.7–10.3)
CHLORIDE SERPL-SCNC: 101 MMOL/L (ref 96–106)
CHOLEST SERPL-MCNC: 188 MG/DL (ref 100–199)
CO2 SERPL-SCNC: 24 MMOL/L (ref 20–29)
CREAT SERPL-MCNC: 0.97 MG/DL (ref 0.57–1)
ERYTHROCYTE [DISTWIDTH] IN BLOOD BY AUTOMATED COUNT: 11.8 % (ref 11.7–15.4)
GLOBULIN SER CALC-MCNC: 2.5 G/DL (ref 1.5–4.5)
GLUCOSE SERPL-MCNC: 100 MG/DL (ref 65–99)
HCT VFR BLD AUTO: 37.1 % (ref 34–46.6)
HDLC SERPL-MCNC: 51 MG/DL
HGB BLD-MCNC: 12.8 G/DL (ref 11.1–15.9)
IMP & REVIEW OF LAB RESULTS: NORMAL
LDLC SERPL CALC-MCNC: 98 MG/DL (ref 0–99)
MCH RBC QN AUTO: 31.8 PG (ref 26.6–33)
MCHC RBC AUTO-ENTMCNC: 34.5 G/DL (ref 31.5–35.7)
MCV RBC AUTO: 92 FL (ref 79–97)
PLATELET # BLD AUTO: 243 X10E3/UL (ref 150–450)
POTASSIUM SERPL-SCNC: 4.7 MMOL/L (ref 3.5–5.2)
PROT SERPL-MCNC: 6.9 G/DL (ref 6–8.5)
RBC # BLD AUTO: 4.03 X10E6/UL (ref 3.77–5.28)
SODIUM SERPL-SCNC: 138 MMOL/L (ref 134–144)
TRIGL SERPL-MCNC: 229 MG/DL (ref 0–149)
TSH SERPL DL<=0.005 MIU/L-ACNC: 2.35 UIU/ML (ref 0.45–4.5)
VLDLC SERPL CALC-MCNC: 39 MG/DL (ref 5–40)
WBC # BLD AUTO: 6.7 X10E3/UL (ref 3.4–10.8)

## 2021-12-10 NOTE — PROGRESS NOTES
Triglyceride Cholesterol reading is high. Decrease fried/fatty foods, butter, oils, alcohol (if you drink), and sugary foods/carbohydrates (bread, potatoes, rice, pasta/noodles, cereal), Increase cardio exercise. Blood count, Thyroid, liver enzymes, kidney function, Vitamin D, and electrolytes are all normal/acceptable. Good!

## 2022-01-27 ENCOUNTER — PATIENT MESSAGE (OUTPATIENT)
Dept: INTERNAL MEDICINE CLINIC | Age: 67
End: 2022-01-27

## 2022-01-27 DIAGNOSIS — I10 ESSENTIAL HYPERTENSION: ICD-10-CM

## 2022-01-28 RX ORDER — VALSARTAN 320 MG/1
320 TABLET ORAL DAILY
Qty: 90 TABLET | Refills: 1 | Status: SHIPPED | OUTPATIENT
Start: 2022-01-28 | End: 2022-06-07 | Stop reason: SDUPTHER

## 2022-03-19 PROBLEM — G47.33 OSA (OBSTRUCTIVE SLEEP APNEA): Status: ACTIVE | Noted: 2021-12-03

## 2022-03-19 PROBLEM — E55.9 VITAMIN D DEFICIENCY: Status: ACTIVE | Noted: 2017-12-04

## 2022-04-22 DIAGNOSIS — Z12.31 ENCOUNTER FOR SCREENING MAMMOGRAM FOR MALIGNANT NEOPLASM OF BREAST: Primary | ICD-10-CM

## 2022-06-07 ENCOUNTER — OFFICE VISIT (OUTPATIENT)
Dept: INTERNAL MEDICINE CLINIC | Age: 67
End: 2022-06-07
Payer: MEDICARE

## 2022-06-07 VITALS
HEIGHT: 65 IN | OXYGEN SATURATION: 98 % | DIASTOLIC BLOOD PRESSURE: 77 MMHG | BODY MASS INDEX: 28.49 KG/M2 | WEIGHT: 171 LBS | RESPIRATION RATE: 18 BRPM | HEART RATE: 108 BPM | TEMPERATURE: 98.4 F | SYSTOLIC BLOOD PRESSURE: 138 MMHG

## 2022-06-07 DIAGNOSIS — E78.00 HYPERCHOLESTEROLEMIA: Primary | ICD-10-CM

## 2022-06-07 DIAGNOSIS — J01.90 ACUTE NON-RECURRENT SINUSITIS, UNSPECIFIED LOCATION: ICD-10-CM

## 2022-06-07 DIAGNOSIS — I10 ESSENTIAL HYPERTENSION: ICD-10-CM

## 2022-06-07 DIAGNOSIS — R53.83 FATIGUE, UNSPECIFIED TYPE: ICD-10-CM

## 2022-06-07 PROCEDURE — G8432 DEP SCR NOT DOC, RNG: HCPCS | Performed by: PHYSICIAN ASSISTANT

## 2022-06-07 PROCEDURE — 3017F COLORECTAL CA SCREEN DOC REV: CPT | Performed by: PHYSICIAN ASSISTANT

## 2022-06-07 PROCEDURE — G8427 DOCREV CUR MEDS BY ELIG CLIN: HCPCS | Performed by: PHYSICIAN ASSISTANT

## 2022-06-07 PROCEDURE — G8752 SYS BP LESS 140: HCPCS | Performed by: PHYSICIAN ASSISTANT

## 2022-06-07 PROCEDURE — G8754 DIAS BP LESS 90: HCPCS | Performed by: PHYSICIAN ASSISTANT

## 2022-06-07 PROCEDURE — G9899 SCRN MAM PERF RSLTS DOC: HCPCS | Performed by: PHYSICIAN ASSISTANT

## 2022-06-07 PROCEDURE — 1101F PT FALLS ASSESS-DOCD LE1/YR: CPT | Performed by: PHYSICIAN ASSISTANT

## 2022-06-07 PROCEDURE — G8399 PT W/DXA RESULTS DOCUMENT: HCPCS | Performed by: PHYSICIAN ASSISTANT

## 2022-06-07 PROCEDURE — 1090F PRES/ABSN URINE INCON ASSESS: CPT | Performed by: PHYSICIAN ASSISTANT

## 2022-06-07 PROCEDURE — G8417 CALC BMI ABV UP PARAM F/U: HCPCS | Performed by: PHYSICIAN ASSISTANT

## 2022-06-07 PROCEDURE — 99214 OFFICE O/P EST MOD 30 MIN: CPT | Performed by: PHYSICIAN ASSISTANT

## 2022-06-07 PROCEDURE — G8536 NO DOC ELDER MAL SCRN: HCPCS | Performed by: PHYSICIAN ASSISTANT

## 2022-06-07 RX ORDER — VALSARTAN 320 MG/1
320 TABLET ORAL DAILY
Qty: 90 TABLET | Refills: 1 | Status: SHIPPED | OUTPATIENT
Start: 2022-06-07

## 2022-06-07 RX ORDER — SIMVASTATIN 40 MG/1
40 TABLET, FILM COATED ORAL
Qty: 90 TABLET | Refills: 1 | Status: SHIPPED | OUTPATIENT
Start: 2022-06-07

## 2022-06-07 RX ORDER — AMOXICILLIN 875 MG/1
875 TABLET, FILM COATED ORAL 2 TIMES DAILY
Qty: 20 TABLET | Refills: 0 | Status: SHIPPED | OUTPATIENT
Start: 2022-06-07

## 2022-06-07 NOTE — PROGRESS NOTES
Michael Michel is a 77 y.o. female  Chief Complaint   Patient presents with    Follow-up     Visit Vitals  /77   Pulse (!) 108   Temp 98.4 °F (36.9 °C)   Resp 18   Ht 5' 5\" (1.651 m)   Wt 171 lb (77.6 kg)   SpO2 98%   BMI 28.46 kg/m²      Health Maintenance Due   Topic Date Due    Pneumococcal 65+ years (2 - PCV) 09/13/2021    Depression Screen  06/16/2022       HPI  HTN Follow up - BP controlled:  BP Readings from Last 3 Encounters:   06/07/22 138/77   12/03/21 123/72   06/16/21 106/64     Currently taking:   Key CAD CHF Meds             valsartan (DIOVAN) 320 mg tablet (Taking) Take 1 Tablet by mouth daily. simvastatin (ZOCOR) 40 mg tablet (Taking) Take 1 Tablet by mouth nightly. spironolactone (ALDACTONE) 25 mg tablet (Taking) Take 0.5 Tablets by mouth daily. Lab Results   Component Value Date/Time    Creatinine 0.97 12/08/2021 09:18 AM     Lab Results   Component Value Date/Time    Cholesterol, total 188 12/08/2021 09:18 AM    HDL Cholesterol 51 12/08/2021 09:18 AM    LDL, calculated 98 12/08/2021 09:18 AM    LDL, calculated 107.8 (H) 06/16/2021 09:14 AM    VLDL, calculated 39 12/08/2021 09:18 AM    VLDL, calculated 31.2 06/16/2021 09:14 AM    Triglyceride 229 (H) 12/08/2021 09:18 AM    CHOL/HDL Ratio 3.2 06/16/2021 09:14 AM     Mucus build up off and on x 6 months \"closes up my throat\" - vomited mucus this morning and then was fine. Wt Readings from Last 3 Encounters:   06/07/22 171 lb (77.6 kg)   12/03/21 171 lb 9.6 oz (77.8 kg)   06/16/21 168 lb (76.2 kg)     ROS  Review of Systems   Constitutional: Negative for fever and malaise/fatigue. HENT: Positive for congestion. Negative for ear pain, sinus pain and sore throat. Respiratory: Positive for cough and sputum production. Negative for shortness of breath and wheezing. Cardiovascular: Negative for chest pain and palpitations. Gastrointestinal: Negative for blood in stool, heartburn and melena.    Neurological: Negative for headaches. Endo/Heme/Allergies: Positive for environmental allergies. Psychiatric/Behavioral: Negative for depression. EXAM  Physical Exam  Vitals and nursing note reviewed. Constitutional:       General: She is not in acute distress. Appearance: She is well-developed. HENT:      Head: Normocephalic and atraumatic. Neck:      Vascular: No JVD. Cardiovascular:      Rate and Rhythm: Normal rate and regular rhythm. Heart sounds: Normal heart sounds. Pulmonary:      Effort: Pulmonary effort is normal. No respiratory distress. Breath sounds: Normal breath sounds. Musculoskeletal:      Cervical back: Neck supple. Skin:     General: Skin is warm and dry. Neurological:      Mental Status: She is alert and oriented to person, place, and time. Psychiatric:         Mood and Affect: Mood normal.         Behavior: Behavior normal.         Thought Content: Thought content normal.         Judgment: Judgment normal.       ASSESSMENT/PLAN  Encounter Diagnoses     ICD-10-CM ICD-9-CM   1. Hypercholesterolemia  E78.00 272.0   2. Essential hypertension  I10 401.9   3.  Acute non-recurrent sinusitis, unspecified location  J01.90 461.9     Orders Placed This Encounter    amoxicillin (AMOXIL) 875 mg tablet    valsartan (DIOVAN) 320 mg tablet    simvastatin (ZOCOR) 40 mg tablet

## 2022-06-07 NOTE — PROGRESS NOTES
Chief Complaint   Patient presents with    Follow-up     1. Have you been to the ER, urgent care clinic since your last visit? Hospitalized since your last visit? No    2. Have you seen or consulted any other health care providers outside of the [address] [place] [address] since your last visit? Include any pap smears or colon screening.  Yes, mammogram and bone density screen

## 2022-10-10 NOTE — TELEPHONE ENCOUNTER
Addended by: JACOB COOPER on: 10/10/2022 09:51 AM     Modules accepted: Orders     Last refill- 11/7/18  Last office visit - 6/5/19  Next office visit - No future appointments. Requested Prescriptions     Pending Prescriptions Disp Refills    simvastatin (ZOCOR) 20 mg tablet 90 Tab 3     Sig: Take 1 Tab by mouth nightly.

## 2022-12-06 ENCOUNTER — OFFICE VISIT (OUTPATIENT)
Dept: INTERNAL MEDICINE CLINIC | Age: 67
End: 2022-12-06
Payer: MEDICARE

## 2022-12-06 VITALS
OXYGEN SATURATION: 99 % | WEIGHT: 174 LBS | TEMPERATURE: 97.5 F | RESPIRATION RATE: 18 BRPM | HEART RATE: 105 BPM | BODY MASS INDEX: 28.99 KG/M2 | SYSTOLIC BLOOD PRESSURE: 126 MMHG | HEIGHT: 65 IN | DIASTOLIC BLOOD PRESSURE: 67 MMHG

## 2022-12-06 DIAGNOSIS — J32.9 CHRONIC SINUSITIS, UNSPECIFIED LOCATION: ICD-10-CM

## 2022-12-06 DIAGNOSIS — E55.9 VITAMIN D DEFICIENCY: ICD-10-CM

## 2022-12-06 DIAGNOSIS — Z00.00 ENCOUNTER FOR MEDICARE ANNUAL WELLNESS EXAM: Primary | ICD-10-CM

## 2022-12-06 DIAGNOSIS — I10 ESSENTIAL HYPERTENSION: ICD-10-CM

## 2022-12-06 DIAGNOSIS — E78.00 HYPERCHOLESTEROLEMIA: ICD-10-CM

## 2022-12-06 PROCEDURE — G0439 PPPS, SUBSEQ VISIT: HCPCS | Performed by: PHYSICIAN ASSISTANT

## 2022-12-06 PROCEDURE — 1090F PRES/ABSN URINE INCON ASSESS: CPT | Performed by: PHYSICIAN ASSISTANT

## 2022-12-06 PROCEDURE — G8754 DIAS BP LESS 90: HCPCS | Performed by: PHYSICIAN ASSISTANT

## 2022-12-06 PROCEDURE — G8427 DOCREV CUR MEDS BY ELIG CLIN: HCPCS | Performed by: PHYSICIAN ASSISTANT

## 2022-12-06 PROCEDURE — 99214 OFFICE O/P EST MOD 30 MIN: CPT | Performed by: PHYSICIAN ASSISTANT

## 2022-12-06 PROCEDURE — G8432 DEP SCR NOT DOC, RNG: HCPCS | Performed by: PHYSICIAN ASSISTANT

## 2022-12-06 PROCEDURE — 3017F COLORECTAL CA SCREEN DOC REV: CPT | Performed by: PHYSICIAN ASSISTANT

## 2022-12-06 PROCEDURE — 1101F PT FALLS ASSESS-DOCD LE1/YR: CPT | Performed by: PHYSICIAN ASSISTANT

## 2022-12-06 PROCEDURE — G8752 SYS BP LESS 140: HCPCS | Performed by: PHYSICIAN ASSISTANT

## 2022-12-06 PROCEDURE — G8417 CALC BMI ABV UP PARAM F/U: HCPCS | Performed by: PHYSICIAN ASSISTANT

## 2022-12-06 PROCEDURE — G8536 NO DOC ELDER MAL SCRN: HCPCS | Performed by: PHYSICIAN ASSISTANT

## 2022-12-06 PROCEDURE — G8399 PT W/DXA RESULTS DOCUMENT: HCPCS | Performed by: PHYSICIAN ASSISTANT

## 2022-12-06 PROCEDURE — G9899 SCRN MAM PERF RSLTS DOC: HCPCS | Performed by: PHYSICIAN ASSISTANT

## 2022-12-06 RX ORDER — AMOXICILLIN AND CLAVULANATE POTASSIUM 875; 125 MG/1; MG/1
1 TABLET, FILM COATED ORAL EVERY 12 HOURS
Qty: 20 TABLET | Refills: 0 | Status: SHIPPED | OUTPATIENT
Start: 2022-12-06

## 2022-12-06 NOTE — PROGRESS NOTES
This is the Subsequent Medicare Annual Wellness Exam, performed 12 months or more after the Initial AWV or the last Subsequent AWV  Health Maintenance Due   Topic Date Due    Pneumococcal 65+ years (2 - PCV) 09/13/2021    COVID-19 Vaccine (3 - Booster for Nataly series) 12/20/2021    Flu Vaccine (1) 08/01/2022    Medicare Yearly Exam  12/04/2022    Lipid Screen  12/08/2022     Depression Risk Factor Screening:     3 most recent PHQ Screens 6/7/2022   Little interest or pleasure in doing things Not at all   Feeling down, depressed, irritable, or hopeless Not at all   Total Score PHQ 2 0       Abuse Screen  Patient is not abused    Fall Risk  Fall Risk Assessment, last 12 mths 6/7/2022   Able to walk? Yes   Fall in past 12 months? 0   Do you feel unsteady? 0   Are you worried about falling 0   Number of falls in past 12 months -   Fall with injury? -       Alcohol Risk Factor Screening:    reports current alcohol use of about 7.0 standard drinks per week. Functional Ability and Level of Safety:   Hearing Loss  Hearing is good. Activities of Daily Living  The home contains: handrails   Patient does total self care     Cognitive Screening   Evaluation of Cognitive Function:  Has your family/caregiver stated any concerns about your memory: no     Patient Care Team   Patient Care Team:  Jovanna Kaplan as PCP - General (Physician Assistant)  Sonali Nixon PA-C as PCP - [facility]  Juan Miguel Del Rosario MD (Obstetrics & Gynecology)  Latrice Syed MD (Dermatology Physician)    Assessment/Plan   Education and counseling provided:  Are appropriate based on today's review and evaluation    Extended Emergency Contact Information  Primary Emergency Contact: Azeb Nava [address] Phone: [phone]  Relation: Child  Secondary Emergency Contact: Colin Herbert  [facility], [address] [place] [address] Phone: [phone]  Mobile Phone: [phone]  Relation: Son    [place] on file    I have reviewed the patient's medical history in detail and updated the computerized patient record. History     Past Medical History:   Diagnosis Date    Cat allergies 7/10/2014    Essential hypertension 10/15/2015    Hypercholesterolemia 8/13/2013    Sleep apnea     \"mild\"    Vitamin D deficiency 12/4/2017      Past Surgical History:   Procedure Laterality Date    HX CATARACT REMOVAL Bilateral 05/2018    Dr. Shawn Benton    Ovaries intact     Current Outpatient Medications   Medication Sig Dispense Refill    cpap machine kit by Does Not Apply route. valsartan (DIOVAN) 320 mg tablet Take 1 Tablet by mouth daily. 90 Tablet 1    simvastatin (ZOCOR) 40 mg tablet Take 1 Tablet by mouth nightly. 90 Tablet 1    spironolactone (ALDACTONE) 25 mg tablet Take 0.5 Tablets by mouth daily. 90 Tablet 1    clindamycin-benzoyl peroxide (Onexton) 1.2 %(1 % base) -3.75 % gel Onexton 1.2 % (1 % base)-3.75 % topical gel   APPLY A PEA-SIZED AMOUNT BY TOPICAL ROUTE ONCE DAILY TO COVER AREAS OF FACE WITH THIN LAYER      VITAMIN B COMPLEX PO Take  by mouth. cholecalciferol, vitamin D3, 50 mcg (2,000 unit) tab Take  by mouth. ONEXTON 1.2 %(1 % base) -3.75 % glwp Apply  to affected area daily. sulfacetamide sodium-sulfur 10-5 % (w/w) clsr Apply  to affected area two (2) times a day. TAZORAC 0.05 % topical cream Apply  to affected area daily. FEXOFENADINE HCL (ALLEGRA ALLERGY PO) Take 180 mg by mouth daily. triamcinolone (NASACORT AQ) 55 mcg nasal inhaler 2 Sprays daily. multivitamin (ONE A DAY) tablet Take 1 Tab by mouth daily.        Allergies   Allergen Reactions    Adhesive Tape-Silicones Contact Dermatitis     Bandaids     Family History   Problem Relation Age of Onset    Elevated Lipids Mother     Alzheimer's Disease Mother     Elevated Lipids Father     Stroke Father 80    Hypertension Father     Breast Cancer Paternal Grandmother      Social History     Tobacco Use    Smoking status: Former     Packs/day: 1.00     Years: 10.00     Pack years: 10.00     Types: Cigarettes     Start date: 1972     Quit date: 1982     Years since quittin.9    Smokeless tobacco: Never   Substance Use Topics    Alcohol use:  Yes     Alcohol/week: 7.0 standard drinks     Types: 7 Standard drinks or equivalent per week     Patient Active Problem List   Diagnosis Code    Hypercholesterolemia E78.00    Cat allergies J30.81    Perennial allergic rhinitis J30.89    Essential hypertension I10    Adult acne L70.9    Vitamin D deficiency E55.9    KATJA (obstructive sleep apnea) G47.33

## 2022-12-06 NOTE — PROGRESS NOTES
Chief Complaint   Patient presents with    Follow-up          Vitals:    12/06/22 1301   BP: 126/67   Pulse: (!) 105   Resp: 18   Temp: 97.5 °F (36.4 °C)   TempSrc: Temporal   SpO2: 99%   Weight: 174 lb (78.9 kg)   Height: 5' 5\" (1.651 m)   PainSc:   0 - No pain       Current Outpatient Medications on File Prior to Visit   Medication Sig Dispense Refill    cpap machine kit by Does Not Apply route. valsartan (DIOVAN) 320 mg tablet Take 1 Tablet by mouth daily. 90 Tablet 1    simvastatin (ZOCOR) 40 mg tablet Take 1 Tablet by mouth nightly. 90 Tablet 1    spironolactone (ALDACTONE) 25 mg tablet Take 0.5 Tablets by mouth daily. 90 Tablet 1    clindamycin-benzoyl peroxide (Onexton) 1.2 %(1 % base) -3.75 % gel Onexton 1.2 % (1 % base)-3.75 % topical gel   APPLY A PEA-SIZED AMOUNT BY TOPICAL ROUTE ONCE DAILY TO COVER AREAS OF FACE WITH THIN LAYER      VITAMIN B COMPLEX PO Take  by mouth. cholecalciferol, vitamin D3, 50 mcg (2,000 unit) tab Take  by mouth. ONEXTON 1.2 %(1 % base) -3.75 % glwp Apply  to affected area daily. sulfacetamide sodium-sulfur 10-5 % (w/w) clsr Apply  to affected area two (2) times a day. TAZORAC 0.05 % topical cream Apply  to affected area daily. FEXOFENADINE HCL (ALLEGRA ALLERGY PO) Take 180 mg by mouth daily. triamcinolone (NASACORT AQ) 55 mcg nasal inhaler 2 Sprays daily. multivitamin (ONE A DAY) tablet Take 1 Tab by mouth daily. [DISCONTINUED] amoxicillin (AMOXIL) 875 mg tablet Take 1 Tablet by mouth two (2) times a day. 20 Tablet 0    [DISCONTINUED] tazorotene (AVAGE) 0.1 % topical cream        No current facility-administered medications on file prior to visit. Health Maintenance Due   Topic    Pneumococcal 65+ years (2 - PCV)    COVID-19 Vaccine (3 - Booster for First Wave series)    Flu Vaccine (1)    Medicare Yearly Exam     Lipid Screen        1. \"Have you been to the ER, urgent care clinic since your last visit?   Hospitalized since your last visit? \" No    2. \"Have you seen or consulted any other health care providers outside of the [address] [place] [address] since your last visit? \" No     3. For patients aged 39-70: Has the patient had a colonoscopy / FIT/ Cologuard? Yes - no Care Gap present      If the patient is female:    4. For patients aged 41-77: Has the patient had a mammogram within the past 2 years? Yes - no Care Gap present      5. For patients aged 21-65: Has the patient had a pap smear?  Yes - no Care Gap present

## 2022-12-09 DIAGNOSIS — E78.00 HYPERCHOLESTEROLEMIA: ICD-10-CM

## 2022-12-09 DIAGNOSIS — R53.83 FATIGUE, UNSPECIFIED TYPE: ICD-10-CM

## 2022-12-09 RX ORDER — SIMVASTATIN 40 MG/1
TABLET, FILM COATED ORAL
Qty: 90 TABLET | Refills: 1 | Status: SHIPPED | OUTPATIENT
Start: 2022-12-09

## 2022-12-10 LAB
25(OH)D3+25(OH)D2 SERPL-MCNC: 51.7 NG/ML (ref 30–100)
ALBUMIN SERPL-MCNC: 4.1 G/DL (ref 3.8–4.8)
ALBUMIN/GLOB SERPL: 1.7 {RATIO} (ref 1.2–2.2)
ALP SERPL-CCNC: 61 IU/L (ref 44–121)
ALT SERPL-CCNC: 26 IU/L (ref 0–32)
AST SERPL-CCNC: 22 IU/L (ref 0–40)
BASOPHILS # BLD AUTO: 0.1 X10E3/UL (ref 0–0.2)
BASOPHILS NFR BLD AUTO: 1 %
BILIRUB SERPL-MCNC: <0.2 MG/DL (ref 0–1.2)
BUN SERPL-MCNC: 18 MG/DL (ref 8–27)
BUN/CREAT SERPL: 22 (ref 12–28)
CALCIUM SERPL-MCNC: 8.9 MG/DL (ref 8.7–10.3)
CHLORIDE SERPL-SCNC: 108 MMOL/L (ref 96–106)
CHOLEST SERPL-MCNC: 146 MG/DL (ref 100–199)
CO2 SERPL-SCNC: 20 MMOL/L (ref 20–29)
CREAT SERPL-MCNC: 0.81 MG/DL (ref 0.57–1)
EGFR: 80 ML/MIN/1.73
EOSINOPHIL # BLD AUTO: 0.2 X10E3/UL (ref 0–0.4)
EOSINOPHIL NFR BLD AUTO: 3 %
ERYTHROCYTE [DISTWIDTH] IN BLOOD BY AUTOMATED COUNT: 12.8 % (ref 11.7–15.4)
GLOBULIN SER CALC-MCNC: 2.4 G/DL (ref 1.5–4.5)
GLUCOSE SERPL-MCNC: 97 MG/DL (ref 70–99)
HCT VFR BLD AUTO: 34.9 % (ref 34–46.6)
HDLC SERPL-MCNC: 43 MG/DL
HGB BLD-MCNC: 11.8 G/DL (ref 11.1–15.9)
IMM GRANULOCYTES # BLD AUTO: 0 X10E3/UL (ref 0–0.1)
IMM GRANULOCYTES NFR BLD AUTO: 1 %
IMP & REVIEW OF LAB RESULTS: NORMAL
LDLC SERPL CALC-MCNC: 69 MG/DL (ref 0–99)
LYMPHOCYTES # BLD AUTO: 1.7 X10E3/UL (ref 0.7–3.1)
LYMPHOCYTES NFR BLD AUTO: 28 %
MCH RBC QN AUTO: 31.9 PG (ref 26.6–33)
MCHC RBC AUTO-ENTMCNC: 33.8 G/DL (ref 31.5–35.7)
MCV RBC AUTO: 94 FL (ref 79–97)
MONOCYTES # BLD AUTO: 0.6 X10E3/UL (ref 0.1–0.9)
MONOCYTES NFR BLD AUTO: 10 %
NEUTROPHILS # BLD AUTO: 3.5 X10E3/UL (ref 1.4–7)
NEUTROPHILS NFR BLD AUTO: 57 %
PLATELET # BLD AUTO: 222 X10E3/UL (ref 150–450)
POTASSIUM SERPL-SCNC: 4.2 MMOL/L (ref 3.5–5.2)
PROT SERPL-MCNC: 6.5 G/DL (ref 6–8.5)
RBC # BLD AUTO: 3.7 X10E6/UL (ref 3.77–5.28)
SODIUM SERPL-SCNC: 141 MMOL/L (ref 134–144)
TRIGL SERPL-MCNC: 209 MG/DL (ref 0–149)
VLDLC SERPL CALC-MCNC: 34 MG/DL (ref 5–40)
WBC # BLD AUTO: 6 X10E3/UL (ref 3.4–10.8)

## 2022-12-10 NOTE — PROGRESS NOTES
Blood Count, thyroid, liver enzymes, kidney function, Vitamin D, and electrolytes are all normal/acceptable. Good! Triglyceride Cholesterol reading is high. Decrease fried/fatty foods, butter, oils, alcohol (if you drink), and sugary foods/carbohydrates (bread, potatoes, rice, pasta/noodles, cereal), Increase cardio exercise.

## 2023-01-19 DIAGNOSIS — I10 ESSENTIAL HYPERTENSION: ICD-10-CM

## 2023-01-20 RX ORDER — VALSARTAN 320 MG/1
TABLET ORAL
Qty: 90 TABLET | Refills: 1 | Status: SHIPPED | OUTPATIENT
Start: 2023-01-20

## 2023-01-29 DIAGNOSIS — L70.9 ADULT ACNE: ICD-10-CM

## 2023-01-29 DIAGNOSIS — I10 ESSENTIAL HYPERTENSION: ICD-10-CM

## 2023-01-30 RX ORDER — SPIRONOLACTONE 25 MG/1
TABLET ORAL
Qty: 45 TABLET | Refills: 1 | Status: SHIPPED | OUTPATIENT
Start: 2023-01-30

## 2023-06-01 SDOH — HEALTH STABILITY: PHYSICAL HEALTH: ON AVERAGE, HOW MANY DAYS PER WEEK DO YOU ENGAGE IN MODERATE TO STRENUOUS EXERCISE (LIKE A BRISK WALK)?: 1 DAY

## 2023-06-01 SDOH — HEALTH STABILITY: PHYSICAL HEALTH: ON AVERAGE, HOW MANY MINUTES DO YOU ENGAGE IN EXERCISE AT THIS LEVEL?: 20 MIN

## 2023-06-01 ASSESSMENT — LIFESTYLE VARIABLES
HAVE YOU OR SOMEONE ELSE BEEN INJURED AS A RESULT OF YOUR DRINKING: NO
HOW MANY STANDARD DRINKS CONTAINING ALCOHOL DO YOU HAVE ON A TYPICAL DAY: 1 OR 2
HOW OFTEN DURING THE LAST YEAR HAVE YOU HAD A FEELING OF GUILT OR REMORSE AFTER DRINKING: LESS THAN MONTHLY
HAVE YOU OR SOMEONE ELSE BEEN INJURED AS A RESULT OF YOUR DRINKING: 0
HAS A RELATIVE, FRIEND, DOCTOR, OR ANOTHER HEALTH PROFESSIONAL EXPRESSED CONCERN ABOUT YOUR DRINKING OR SUGGESTED YOU CUT DOWN: NO
HOW OFTEN DURING THE LAST YEAR HAVE YOU HAD A FEELING OF GUILT OR REMORSE AFTER DRINKING: 1
HOW MANY STANDARD DRINKS CONTAINING ALCOHOL DO YOU HAVE ON A TYPICAL DAY: 1
HOW OFTEN DO YOU HAVE A DRINK CONTAINING ALCOHOL: 4 OR MORE TIMES A WEEK
HOW OFTEN DURING THE LAST YEAR HAVE YOU NEEDED AN ALCOHOLIC DRINK FIRST THING IN THE MORNING TO GET YOURSELF GOING AFTER A NIGHT OF HEAVY DRINKING: NEVER
HOW OFTEN DO YOU HAVE SIX OR MORE DRINKS ON ONE OCCASION: 1
HOW OFTEN DURING THE LAST YEAR HAVE YOU FOUND THAT YOU WERE NOT ABLE TO STOP DRINKING ONCE YOU HAD STARTED: 0
HOW OFTEN DURING THE LAST YEAR HAVE YOU FAILED TO DO WHAT WAS NORMALLY EXPECTED FROM YOU BECAUSE OF DRINKING: 0
HOW OFTEN DURING THE LAST YEAR HAVE YOU BEEN UNABLE TO REMEMBER WHAT HAPPENED THE NIGHT BEFORE BECAUSE YOU HAD BEEN DRINKING: 0
HAS A RELATIVE, FRIEND, DOCTOR, OR ANOTHER HEALTH PROFESSIONAL EXPRESSED CONCERN ABOUT YOUR DRINKING OR SUGGESTED YOU CUT DOWN: 0
HOW OFTEN DURING THE LAST YEAR HAVE YOU FOUND THAT YOU WERE NOT ABLE TO STOP DRINKING ONCE YOU HAD STARTED: NEVER
HOW OFTEN DURING THE LAST YEAR HAVE YOU BEEN UNABLE TO REMEMBER WHAT HAPPENED THE NIGHT BEFORE BECAUSE YOU HAD BEEN DRINKING: NEVER
HOW OFTEN DURING THE LAST YEAR HAVE YOU FAILED TO DO WHAT WAS NORMALLY EXPECTED FROM YOU BECAUSE OF DRINKING: NEVER
HOW OFTEN DO YOU HAVE A DRINK CONTAINING ALCOHOL: 5
HOW OFTEN DURING THE LAST YEAR HAVE YOU NEEDED AN ALCOHOLIC DRINK FIRST THING IN THE MORNING TO GET YOURSELF GOING AFTER A NIGHT OF HEAVY DRINKING: 0

## 2023-06-01 ASSESSMENT — PATIENT HEALTH QUESTIONNAIRE - PHQ9
SUM OF ALL RESPONSES TO PHQ QUESTIONS 1-9: 0
2. FEELING DOWN, DEPRESSED OR HOPELESS: 0
1. LITTLE INTEREST OR PLEASURE IN DOING THINGS: 0
SUM OF ALL RESPONSES TO PHQ QUESTIONS 1-9: 0
SUM OF ALL RESPONSES TO PHQ9 QUESTIONS 1 & 2: 0
SUM OF ALL RESPONSES TO PHQ QUESTIONS 1-9: 0
SUM OF ALL RESPONSES TO PHQ QUESTIONS 1-9: 0

## 2023-06-04 SDOH — ECONOMIC STABILITY: INCOME INSECURITY: HOW HARD IS IT FOR YOU TO PAY FOR THE VERY BASICS LIKE FOOD, HOUSING, MEDICAL CARE, AND HEATING?: NOT HARD AT ALL

## 2023-06-04 SDOH — ECONOMIC STABILITY: FOOD INSECURITY: WITHIN THE PAST 12 MONTHS, THE FOOD YOU BOUGHT JUST DIDN'T LAST AND YOU DIDN'T HAVE MONEY TO GET MORE.: NEVER TRUE

## 2023-06-04 SDOH — ECONOMIC STABILITY: FOOD INSECURITY: WITHIN THE PAST 12 MONTHS, YOU WORRIED THAT YOUR FOOD WOULD RUN OUT BEFORE YOU GOT MONEY TO BUY MORE.: NEVER TRUE

## 2023-06-04 SDOH — ECONOMIC STABILITY: HOUSING INSECURITY
IN THE LAST 12 MONTHS, WAS THERE A TIME WHEN YOU DID NOT HAVE A STEADY PLACE TO SLEEP OR SLEPT IN A SHELTER (INCLUDING NOW)?: NO

## 2023-06-04 SDOH — ECONOMIC STABILITY: TRANSPORTATION INSECURITY
IN THE PAST 12 MONTHS, HAS LACK OF TRANSPORTATION KEPT YOU FROM MEETINGS, WORK, OR FROM GETTING THINGS NEEDED FOR DAILY LIVING?: NO

## 2023-06-05 ENCOUNTER — OFFICE VISIT (OUTPATIENT)
Age: 68
End: 2023-06-05
Payer: MEDICARE

## 2023-06-05 VITALS
SYSTOLIC BLOOD PRESSURE: 133 MMHG | HEIGHT: 65 IN | WEIGHT: 178 LBS | BODY MASS INDEX: 29.66 KG/M2 | RESPIRATION RATE: 14 BRPM | TEMPERATURE: 98.2 F | OXYGEN SATURATION: 91 % | DIASTOLIC BLOOD PRESSURE: 68 MMHG | HEART RATE: 79 BPM

## 2023-06-05 DIAGNOSIS — R63.5 WEIGHT GAIN: ICD-10-CM

## 2023-06-05 DIAGNOSIS — E78.00 HYPERCHOLESTEROLEMIA: ICD-10-CM

## 2023-06-05 DIAGNOSIS — E66.3 OVERWEIGHT (BMI 25.0-29.9): ICD-10-CM

## 2023-06-05 DIAGNOSIS — G47.33 OSA (OBSTRUCTIVE SLEEP APNEA): ICD-10-CM

## 2023-06-05 DIAGNOSIS — I10 ESSENTIAL HYPERTENSION: ICD-10-CM

## 2023-06-05 DIAGNOSIS — Z79.899 OTHER LONG TERM (CURRENT) DRUG THERAPY: ICD-10-CM

## 2023-06-05 DIAGNOSIS — Z00.00 MEDICARE ANNUAL WELLNESS VISIT, SUBSEQUENT: Primary | ICD-10-CM

## 2023-06-05 PROCEDURE — 3017F COLORECTAL CA SCREEN DOC REV: CPT | Performed by: PHYSICIAN ASSISTANT

## 2023-06-05 PROCEDURE — 99213 OFFICE O/P EST LOW 20 MIN: CPT | Performed by: PHYSICIAN ASSISTANT

## 2023-06-05 PROCEDURE — 3078F DIAST BP <80 MM HG: CPT | Performed by: PHYSICIAN ASSISTANT

## 2023-06-05 PROCEDURE — 3075F SYST BP GE 130 - 139MM HG: CPT | Performed by: PHYSICIAN ASSISTANT

## 2023-06-05 PROCEDURE — G0439 PPPS, SUBSEQ VISIT: HCPCS | Performed by: PHYSICIAN ASSISTANT

## 2023-06-05 PROCEDURE — 1123F ACP DISCUSS/DSCN MKR DOCD: CPT | Performed by: PHYSICIAN ASSISTANT

## 2023-06-05 RX ORDER — SIMVASTATIN 40 MG
40 TABLET ORAL NIGHTLY
Qty: 90 TABLET | Refills: 1 | Status: SHIPPED | OUTPATIENT
Start: 2023-06-05

## 2023-06-05 RX ORDER — VALSARTAN 320 MG/1
320 TABLET ORAL DAILY
Qty: 90 TABLET | Refills: 1 | Status: SHIPPED | OUTPATIENT
Start: 2023-06-05

## 2023-06-05 ASSESSMENT — ENCOUNTER SYMPTOMS
COUGH: 0
SHORTNESS OF BREATH: 0

## 2023-06-05 NOTE — PATIENT INSTRUCTIONS
Wait for an ambulance. Do not try to drive yourself. Watch closely for changes in your health, and be sure to contact your doctor if you have any problems. Where can you learn more? Go to http://www.solorzano.com/ and enter F075 to learn more about \"A Healthy Heart: Care Instructions. \"  Current as of: September 7, 2022               Content Version: 13.6  © 2006-2023 [employer]. Care instructions adapted under license by [employer] [facility] ([facility]). If you have questions about a medical condition or this instruction, always ask your healthcare professional. [place] [address] any warranty or liability for your use of this information. Personalized Preventive Plan for Rosalind Art - 6/5/2023  Medicare offers a range of preventive health benefits. Some of the tests and screenings are paid in full while other may be subject to a deductible, co-insurance, and/or copay. Some of these benefits include a comprehensive review of your medical history including lifestyle, illnesses that may run in your family, and various assessments and screenings as appropriate. After reviewing your medical record and screening and assessments performed today your provider may have ordered immunizations, labs, imaging, and/or referrals for you. A list of these orders (if applicable) as well as your Preventive Care list are included within your After Visit Summary for your review. Other Preventive Recommendations:    A preventive eye exam performed by an eye specialist is recommended every 1-2 years to screen for glaucoma; cataracts, macular degeneration, and other eye disorders. A preventive dental visit is recommended every 6 months. Try to get at least 150 minutes of exercise per week or 10,000 steps per day on a pedometer . Order or download the FREE \"Exercise & Physical Activity: Your Everyday Guide\" from [employer].  Call [phone] or search [facility]

## 2023-06-05 NOTE — PROGRESS NOTES
Medicare Annual Wellness Visit    Elizabeth Browne is here for Medicare AWV (No concerns - not fasting )    Assessment & Plan   Medicare annual wellness visit, subsequent  Recommendations for Preventive Services Due: see orders and patient instructions/AVS.  Recommended screening schedule for the next 5-10 years is provided to the patient in written form: see Patient Instructions/AVS.     No follow-ups on file. Subjective       Patient's complete Health Risk Assessment and screening values have been reviewed and are found in Flowsheets. The following problems were reviewed today and where indicated follow up appointments were made and/or referrals ordered. No Positive Risk Factors identified today. Objective   Vitals:    06/05/23 1302   BP: 133/68   Site: Left Upper Arm   Position: Sitting   Cuff Size: Medium Adult   Pulse: 79   Resp: 14   Temp: 98.2 °F (36.8 °C)   TempSrc: Temporal   SpO2: 91%   Weight: 178 lb (80.7 kg)   Height: 5' 5\" (1.651 m)      Body mass index is 29.62 kg/m². Allergies   Allergen Reactions    Adhesive Tape Dermatitis     Bandaids     Prior to Visit Medications    Medication Sig Taking?  Authorizing Provider   B Complex Vitamins (VITAMIN B COMPLEX PO) Take by mouth Yes Ar Automatic Reconciliation   Cholecalciferol 50 MCG (2000 UT) TABS Take by mouth Yes Ar Automatic Reconciliation   Clindamycin Phos-Benzoyl Perox (ONEXTON) 1.2-3.75 % GEL Onexton 1.2 % (1 % base)-3.75 % topical gel   APPLY A PEA-SIZED AMOUNT BY TOPICAL ROUTE ONCE DAILY TO COVER AREAS OF FACE WITH THIN LAYER Yes Ar Automatic Reconciliation   simvastatin (ZOCOR) 40 MG tablet Take 1 tablet by mouth nightly Yes Ar Automatic Reconciliation   spironolactone (ALDACTONE) 25 MG tablet Take 0.5 tablets by mouth daily Yes Ar Automatic Reconciliation   Sulfacetamide Sodium-Sulfur 10-5 % SUSP Apply topically 2 times daily Yes Ar Automatic Reconciliation   tazarotene (TAZORAC) 0.05 % cream
Breath sounds: Normal breath sounds. Musculoskeletal:      Cervical back: Neck supple. Skin:     General: Skin is warm and dry. Neurological:      General: No focal deficit present. Mental Status: She is alert and oriented to person, place, and time. Psychiatric:         Mood and Affect: Mood normal.         Behavior: Behavior normal.         Thought Content: Thought content normal.         Judgment: Judgment normal.       ASSESSMENT/PLAN    ICD-10-CM    1. Medicare annual wellness visit, subsequent  Z00.00       2. Essential hypertension  I10 valsartan (DIOVAN) 320 MG tablet     Comprehensive Metabolic Panel     Hemoglobin A1C      3. Hypercholesterolemia  E78.00 simvastatin (ZOCOR) 40 MG tablet     Hemoglobin A1C     Lipid Panel      4. BELINDA (obstructive sleep apnea)  G47.33 Hemoglobin A1C      5. Weight gain  R63.5 Hemoglobin A1C     Thyroid Cascade Profile      6. Overweight (BMI 25.0-29. 9)  E66.3       7. Other long term (current) drug therapy  Z79.899 Hemoglobin A1C         Discussed diet/exercise and options for/importance of losing weight.

## 2023-06-06 DIAGNOSIS — E78.00 HYPERCHOLESTEROLEMIA: ICD-10-CM

## 2023-06-07 RX ORDER — SIMVASTATIN 40 MG
TABLET ORAL
Qty: 90 TABLET | Refills: 1 | OUTPATIENT
Start: 2023-06-07

## 2023-06-28 LAB — HBA1C MFR BLD: 6.1 % (ref 4.8–5.6)

## 2023-06-29 LAB
ALBUMIN SERPL-MCNC: 4.4 G/DL (ref 3.8–4.8)
ALBUMIN/GLOB SERPL: 1.8 {RATIO} (ref 1.2–2.2)
ALP SERPL-CCNC: 72 IU/L (ref 44–121)
ALT SERPL-CCNC: 38 IU/L (ref 0–32)
AST SERPL-CCNC: 27 IU/L (ref 0–40)
BILIRUB SERPL-MCNC: 0.3 MG/DL (ref 0–1.2)
BUN SERPL-MCNC: 19 MG/DL (ref 8–27)
BUN/CREAT SERPL: 21 (ref 12–28)
CALCIUM SERPL-MCNC: 9.6 MG/DL (ref 8.7–10.3)
CHLORIDE SERPL-SCNC: 102 MMOL/L (ref 96–106)
CHOLEST SERPL-MCNC: 183 MG/DL (ref 100–199)
CO2 SERPL-SCNC: 22 MMOL/L (ref 20–29)
CREAT SERPL-MCNC: 0.9 MG/DL (ref 0.57–1)
EGFRCR SERPLBLD CKD-EPI 2021: 70 ML/MIN/1.73
GLOBULIN SER CALC-MCNC: 2.4 G/DL (ref 1.5–4.5)
GLUCOSE SERPL-MCNC: 98 MG/DL (ref 70–99)
HDLC SERPL-MCNC: 49 MG/DL
IMP & REVIEW OF LAB RESULTS: NORMAL
LDLC SERPL CALC-MCNC: 97 MG/DL (ref 0–99)
POTASSIUM SERPL-SCNC: 5.1 MMOL/L (ref 3.5–5.2)
PROT SERPL-MCNC: 6.8 G/DL (ref 6–8.5)
SODIUM SERPL-SCNC: 139 MMOL/L (ref 134–144)
TRIGL SERPL-MCNC: 218 MG/DL (ref 0–149)
TSH SERPL DL<=0.005 MIU/L-ACNC: 1.52 UIU/ML (ref 0.45–4.5)
VLDLC SERPL CALC-MCNC: 37 MG/DL (ref 5–40)

## 2023-07-03 PROBLEM — R73.09 ELEVATED HEMOGLOBIN A1C: Status: ACTIVE | Noted: 2023-07-03

## 2023-07-03 PROBLEM — R73.03 PREDIABETES: Status: ACTIVE | Noted: 2023-07-03

## 2023-08-02 RX ORDER — SPIRONOLACTONE 25 MG/1
TABLET ORAL
Qty: 45 TABLET | Refills: 1 | Status: SHIPPED | OUTPATIENT
Start: 2023-08-02

## 2024-01-18 DIAGNOSIS — I10 ESSENTIAL HYPERTENSION: ICD-10-CM

## 2024-03-20 RX ORDER — SPIRONOLACTONE 25 MG/1
TABLET ORAL
Qty: 45 TABLET | Refills: 1 | OUTPATIENT
Start: 2024-03-20

## 2024-03-20 RX ORDER — VALSARTAN 320 MG/1
320 TABLET ORAL DAILY
Qty: 90 TABLET | Refills: 1 | OUTPATIENT
Start: 2024-03-20